# Patient Record
Sex: FEMALE | Race: BLACK OR AFRICAN AMERICAN | Employment: FULL TIME | URBAN - METROPOLITAN AREA
[De-identification: names, ages, dates, MRNs, and addresses within clinical notes are randomized per-mention and may not be internally consistent; named-entity substitution may affect disease eponyms.]

---

## 2018-06-08 ENCOUNTER — TELEPHONE (OUTPATIENT)
Dept: ORTHOPEDIC SURGERY | Age: 33
End: 2018-06-08

## 2018-06-18 ENCOUNTER — TELEPHONE (OUTPATIENT)
Dept: ORTHOPEDIC SURGERY | Age: 33
End: 2018-06-18

## 2018-06-18 DIAGNOSIS — M79.671 BILATERAL FOOT PAIN: Primary | ICD-10-CM

## 2018-06-18 DIAGNOSIS — M25.572 CHRONIC PAIN OF BOTH ANKLES: ICD-10-CM

## 2018-06-18 DIAGNOSIS — M89.8X6 TIBIAL PAIN: ICD-10-CM

## 2018-06-18 DIAGNOSIS — M79.672 BILATERAL FOOT PAIN: Primary | ICD-10-CM

## 2018-06-18 DIAGNOSIS — M25.571 CHRONIC PAIN OF BOTH ANKLES: ICD-10-CM

## 2018-06-18 DIAGNOSIS — G89.29 CHRONIC PAIN OF BOTH ANKLES: ICD-10-CM

## 2018-06-27 NOTE — TELEPHONE ENCOUNTER
Was able to reach pt on third attempt--she stated was at [facility] for bilat foot and ankle pain--had prior surgery in august of 2014 for repair of nonunion of tibia (ORIF) and tibial autografting of nonunion site-removal of hardware deep right tibia by dr checo mark--pt has follow up in this office for issues concerning op site--pt wants to be seen here for continued and worsening foot and ankle pain--appt made for 7/24/18 at 0930

## 2018-07-24 ENCOUNTER — HOSPITAL ENCOUNTER (OUTPATIENT)
Dept: GENERAL RADIOLOGY | Age: 33
Discharge: HOME | End: 2018-07-26
Payer: MEDICAID

## 2018-07-24 ENCOUNTER — OFFICE VISIT (OUTPATIENT)
Dept: ORTHOPEDIC SURGERY | Age: 33
End: 2018-07-24
Payer: MEDICAID

## 2018-07-24 VITALS
BODY MASS INDEX: 42.68 KG/M2 | TEMPERATURE: 97.5 F | WEIGHT: 250 LBS | SYSTOLIC BLOOD PRESSURE: 128 MMHG | HEIGHT: 64 IN | DIASTOLIC BLOOD PRESSURE: 84 MMHG | HEART RATE: 82 BPM

## 2018-07-24 DIAGNOSIS — G89.29 CHRONIC PAIN OF BOTH ANKLES: ICD-10-CM

## 2018-07-24 DIAGNOSIS — M19.171 POST-TRAUMATIC ARTHRITIS OF RIGHT ANKLE: ICD-10-CM

## 2018-07-24 DIAGNOSIS — M79.672 BILATERAL FOOT PAIN: ICD-10-CM

## 2018-07-24 DIAGNOSIS — M25.571 CHRONIC PAIN OF BOTH ANKLES: ICD-10-CM

## 2018-07-24 DIAGNOSIS — M79.672 HEEL PAIN, BILATERAL: Primary | ICD-10-CM

## 2018-07-24 DIAGNOSIS — M79.671 HEEL PAIN, BILATERAL: Primary | ICD-10-CM

## 2018-07-24 DIAGNOSIS — M25.572 CHRONIC PAIN OF BOTH ANKLES: ICD-10-CM

## 2018-07-24 DIAGNOSIS — M79.671 BILATERAL FOOT PAIN: ICD-10-CM

## 2018-07-24 DIAGNOSIS — M89.8X6 TIBIAL PAIN: ICD-10-CM

## 2018-07-24 PROCEDURE — 73630 X-RAY EXAM OF FOOT: CPT

## 2018-07-24 PROCEDURE — 73610 X-RAY EXAM OF ANKLE: CPT

## 2018-07-24 PROCEDURE — G8427 DOCREV CUR MEDS BY ELIG CLIN: HCPCS | Performed by: ORTHOPAEDIC SURGERY

## 2018-07-24 PROCEDURE — 99213 OFFICE O/P EST LOW 20 MIN: CPT | Performed by: PHYSICIAN ASSISTANT

## 2018-07-24 PROCEDURE — G8419 CALC BMI OUT NRM PARAM NOF/U: HCPCS | Performed by: ORTHOPAEDIC SURGERY

## 2018-07-24 PROCEDURE — 4004F PT TOBACCO SCREEN RCVD TLK: CPT | Performed by: ORTHOPAEDIC SURGERY

## 2018-07-24 PROCEDURE — 99202 OFFICE O/P NEW SF 15 MIN: CPT

## 2018-07-24 PROCEDURE — 73590 X-RAY EXAM OF LOWER LEG: CPT

## 2018-07-24 RX ORDER — VALACYCLOVIR HYDROCHLORIDE 500 MG/1
500 TABLET, FILM COATED ORAL DAILY
COMMUNITY
Start: 2018-07-11 | End: 2021-01-25 | Stop reason: ALTCHOICE

## 2018-07-24 RX ORDER — NORETHINDRONE 0.35 MG
KIT ORAL
COMMUNITY
Start: 2018-06-23 | End: 2019-04-04

## 2018-07-24 RX ORDER — ACYCLOVIR 400 MG/1
400 TABLET ORAL 2 TIMES DAILY
COMMUNITY
Start: 2018-07-11

## 2018-07-24 NOTE — PATIENT INSTRUCTIONS
Purchase supportive shoes, and gel insert heels, start physical therapy, wear ankle brace as needed for comfort, ice over heals every 20 minutes at a time every 2 hours as needed, consider ankle injection call if wish to proceed with it; Voltaren called in to pharmacy take twice daily as needed for pain.

## 2018-07-24 NOTE — PROGRESS NOTES
congestion, sinus pressure, sneezing, sore throat. Negative for headache. Eyes: Negative for visual disturbance, blurred and double vision. Negative for pain, discharge, redness and itching  Respiratory: Negative for cough, shortness of breath and wheezing. Cardiovascular: Negative for chest pain, palpitations and leg swelling. No dyspnea on exertion   Gastrointestinal:   Negative for nausea, vomiting, abdominal pain, diarrhea, constipation  or black or bloody. Hematologic\Lymphatic:  negative for bleeding, petechiae,   Genitourinary: Negative for hematuria and difficulty urinating. Musculoskeletal: Negative for neck pain and stiffness. Negative for back pain, see HPI  Skin: Negative for pallor, rash and wound. Neurological: Negative for dizziness, tremors, seizures, weakness, light-headedness, no TIA or stroke symptoms. No numbness and headaches. Psychiatric/Behavioral: Negative. OBJECTIVE:      Physical Examination:   General appearance: alert, well appearing, and in no distress,  normal appearing weight. No visible signs of trauma   Mental status: alert, oriented to person, place, and time, normal mood, behavior, speech, dress, motor activity, and thought processes  Abdomen: soft, nondistended  Resp:   resp easy and unlabored, no audible wheezes note, normal symmetrical expansion of both hemithoraces  Cardiac: distal pulses palpable, skin and extremities well perfused  Neurological: alert, oriented X3, normal speech, no focal findings or movement disorder noted, motor and sensory grossly normal bilaterally, normal muscle tone, no tremors, strength 5/5, normal gait and station  HEENT: normochephalic atraumatic, external ears and eyes normal, sclera normal, neck supple, no nasal discharge.    Extremities:   peripheral pulses normal, no edema, redness or tenderness in the calves   Skin: normal coloration, no rashes or open wounds, no suspicious skin lesions noted  Psych: Affect euthymic

## 2018-08-01 ENCOUNTER — HOSPITAL ENCOUNTER (OUTPATIENT)
Dept: PHYSICAL THERAPY | Age: 33
Setting detail: THERAPIES SERIES
Discharge: HOME | End: 2018-08-01
Payer: MEDICAID

## 2018-08-01 PROCEDURE — G8979 MOBILITY GOAL STATUS: HCPCS | Performed by: PHYSICAL THERAPIST

## 2018-08-01 PROCEDURE — 97161 PT EVAL LOW COMPLEX 20 MIN: CPT | Performed by: PHYSICAL THERAPIST

## 2018-08-01 PROCEDURE — G8978 MOBILITY CURRENT STATUS: HCPCS | Performed by: PHYSICAL THERAPIST

## 2018-08-01 ASSESSMENT — ACTIVITIES OF DAILY LIVING (ADL): EFFECT OF PAIN ON DAILY ACTIVITIES: PAIN HAMPERS PROLONGED STANDING/WALKING AS WELL AS SLEEP AT TIMES

## 2018-08-01 ASSESSMENT — PAIN SCALES - GENERAL: PAINLEVEL_OUTOF10: 6

## 2018-08-01 ASSESSMENT — PAIN DESCRIPTION - ORIENTATION: ORIENTATION: RIGHT

## 2018-08-01 ASSESSMENT — PAIN DESCRIPTION - PAIN TYPE: TYPE: CHRONIC PAIN

## 2018-08-01 ASSESSMENT — PAIN DESCRIPTION - DESCRIPTORS: DESCRIPTORS: ACHING;CONSTANT

## 2018-08-01 ASSESSMENT — PAIN DESCRIPTION - LOCATION: LOCATION: ANKLE

## 2018-08-08 ENCOUNTER — HOSPITAL ENCOUNTER (OUTPATIENT)
Dept: PHYSICAL THERAPY | Age: 33
Setting detail: THERAPIES SERIES
Discharge: HOME | End: 2018-08-08
Payer: MEDICAID

## 2018-08-08 PROCEDURE — 97530 THERAPEUTIC ACTIVITIES: CPT

## 2018-08-08 PROCEDURE — 97110 THERAPEUTIC EXERCISES: CPT

## 2018-08-16 ENCOUNTER — HOSPITAL ENCOUNTER (OUTPATIENT)
Dept: PHYSICAL THERAPY | Age: 33
Setting detail: THERAPIES SERIES
Discharge: HOME | End: 2018-08-16
Payer: MEDICAID

## 2018-08-16 PROCEDURE — 97110 THERAPEUTIC EXERCISES: CPT | Performed by: PHYSICAL THERAPIST

## 2018-08-16 PROCEDURE — 97530 THERAPEUTIC ACTIVITIES: CPT | Performed by: PHYSICAL THERAPIST

## 2018-08-16 PROCEDURE — G0283 ELEC STIM OTHER THAN WOUND: HCPCS | Performed by: PHYSICAL THERAPIST

## 2018-08-17 ENCOUNTER — HOSPITAL ENCOUNTER (OUTPATIENT)
Dept: PHYSICAL THERAPY | Age: 33
Setting detail: THERAPIES SERIES
Discharge: HOME | End: 2018-08-17
Payer: MEDICAID

## 2018-08-17 PROCEDURE — 97530 THERAPEUTIC ACTIVITIES: CPT | Performed by: PHYSICAL THERAPIST

## 2018-08-17 PROCEDURE — 97110 THERAPEUTIC EXERCISES: CPT | Performed by: PHYSICAL THERAPIST

## 2018-08-17 PROCEDURE — G0283 ELEC STIM OTHER THAN WOUND: HCPCS | Performed by: PHYSICAL THERAPIST

## 2018-08-17 NOTE — PROGRESS NOTES
[address] [facility]                Phone: [phone]   Fax: [phone]    Physical Therapy Daily Treatment Note  Date:  2018    Patient Name:  Misti Recio    :  1985  MRN: [id number]    Evaluating therapist:  DIDI Fischer            (18)  Restrictions/Precautions:    Diagnosis:  post-traumatic OA R ankle  Treatment Diagnosis:    Insurance/Certification information:  [address]  Referring Practitioner:  Luz Fletcher of care signed (Y/N):    Visit# / total visits:  4/10  Pain level: 3/10 R ankle  10/10 R knee    Time In:    0852  Time Out:  1005    Subjective:        Exercises:   Exercise/Equipment Resistance/Repetitions Other comments    StepOne   10 min              towel st 5x20s             Total Gym squats 3x10               L8             BAPS ankle PF/DF 20x each direction                 IN/EV 20x each direction                Circles (CW/ CCW)      toe raises 3x10      step ups 2x10x4\"             sides              Marching on foam  3x30s                                             Comments:      Home Exercise Program:  provided 18    Manual Treatments:      Modalities:   IFC/MH to R ankle x 15 min     Timed Code Treatment Minutes: Total Treatment Minutes:      Treatment/Activity Tolerance:  [] Patient tolerated treatment well [] Patient limited by fatique  [x] Patient limited by pain  [] Patient limited by other medical complications  [] Other:     Prognosis: [] Good [x] Fair  [] Poor    Patient Requires Follow-up: [x] Yes  [] No    Plan:   [x] Continue per plan of care [] Alter current plan (see comments)  [] Plan of care initiated [] Hold pending MD visit [] Discharge  Plan for Next Session:    Progress POC as tolerated next session. See Weekly Progress Note: []  Yes  [x]  No  Next due:        Electronically signed by:     Gloria Valdes DPT  [id number]

## 2018-08-17 NOTE — PROGRESS NOTES
S:  pt presents to therapy for two of two scheduled visits this week; at week's end she reports that her ankle/heels continue to ache with all prolonged activities; pain level given as 3/10 and seems constant in nature; no c/o buckling or LOB over last week's time; HEP going well per pt    O:  performed the exercises/treatments as written in the flowsheet for the week ending 8/17/18; initiated HEP for home management and advised pt to purchase B heel cups for her shoes; AROM R ankle WFL for all ranges; strength across R ankle grossly 4/5 for all planes     A:  alyssa tx well; pt able to perform all requested tasks with good form and slow pacing noted; AROM/strength across R ankle grossly stable since eval; gait stable with decreased stance and toe off noted R LE due to ankle pain; static/dynamic balance is GOOD; endurance for all prolonged activities is GOOD-    P:  cont with POC of stretching/strengthening for R ankle with modalities as needed

## 2018-08-22 ENCOUNTER — HOSPITAL ENCOUNTER (OUTPATIENT)
Dept: PHYSICAL THERAPY | Age: 33
Setting detail: THERAPIES SERIES
Discharge: HOME | End: 2018-08-22
Payer: MEDICAID

## 2018-08-22 PROCEDURE — G0283 ELEC STIM OTHER THAN WOUND: HCPCS | Performed by: PHYSICAL THERAPIST

## 2018-08-22 PROCEDURE — 97110 THERAPEUTIC EXERCISES: CPT | Performed by: PHYSICAL THERAPIST

## 2018-08-22 PROCEDURE — 97530 THERAPEUTIC ACTIVITIES: CPT | Performed by: PHYSICAL THERAPIST

## 2018-08-22 NOTE — PROGRESS NOTES
[address] [facility]                Phone: [phone]   Fax: [phone]    Physical Therapy Daily Treatment Note  Date:  2018    Patient Name:  Kim Juares    :  1985  MRN: [id number]    Evaluating therapist:  DIDI Pritchard            (18)  Restrictions/Precautions:    Diagnosis:  post-traumatic OA R ankle  Treatment Diagnosis:    Insurance/Certification information:  [address]  Referring Practitioner:  Brook Vizcaino of care signed (Y/N):    Visit# / total visits:  5/10  Pain level: 310 R ankle  10/10 R knee    Time In:    0932  Time Out:  1040    Subjective:        Exercises:   Exercise/Equipment Resistance/Repetitions Other comments    StepOne   10 min              towel st 5x20s             Total Gym squats 3x10               L8             BAPS ankle PF/DF 20x each direction                 IN/EV 20x each direction                Circles (CW/ CCW)      toe raises 3x10      step ups 2x10x4\"             sides              Marching on foam  3x30s                                             Comments:      Home Exercise Program:  provided 18    Manual Treatments:      Modalities:   IFC/MH to R ankle x 15 min     Timed Code Treatment Minutes: Total Treatment Minutes:      Treatment/Activity Tolerance:  [] Patient tolerated treatment well [] Patient limited by fatique  [x] Patient limited by pain  [] Patient limited by other medical complications  [] Other:     Prognosis: [] Good [x] Fair  [] Poor    Patient Requires Follow-up: [x] Yes  [] No    Plan:   [x] Continue per plan of care [] Alter current plan (see comments)  [] Plan of care initiated [] Hold pending MD visit [] Discharge  Plan for Next Session:    Progress POC as tolerated next session. See Weekly Progress Note: []  Yes  [x]  No  Next due:        Electronically signed by:     Yesica Rodriguez DPT  [id number]

## 2018-08-24 ENCOUNTER — HOSPITAL ENCOUNTER (OUTPATIENT)
Dept: PHYSICAL THERAPY | Age: 33
Setting detail: THERAPIES SERIES
Discharge: HOME | End: 2018-08-24
Payer: MEDICAID

## 2018-08-24 NOTE — PROGRESS NOTES
S:  pt presents to therapy for one of two scheduled visits this week, having cancelled on 8/24/18; at week's only visit she continued to report  her ankle/heels continue to ache with all prolonged activities; pain level given as 3/10 and seems constant in nature; no c/o buckling or LOB over last week's time; HEP going well per pt    O:  performed the exercises/treatments as written in the flowsheet for the week ending 8/23/18;   AROM R ankle [facility] for all ranges; strength across R ankle grossly 4/5 for all planes     A:  alyssa tx well; pt able to perform all requested tasks with good form and slow pacing noted; AROM/strength across R ankle grossly stable since eval; gait stable with decreased stance and toe off noted R LE due to ankle pain; static/dynamic balance is GOOD; endurance for all prolonged activities is GOOD-    P:  cont with POC of stretching/strengthening for R ankle with modalities as needed

## 2018-08-29 ENCOUNTER — HOSPITAL ENCOUNTER (OUTPATIENT)
Dept: PHYSICAL THERAPY | Age: 33
Setting detail: THERAPIES SERIES
Discharge: HOME | End: 2018-08-29
Payer: MEDICAID

## 2018-08-29 PROCEDURE — G0283 ELEC STIM OTHER THAN WOUND: HCPCS | Performed by: PHYSICAL THERAPIST

## 2018-08-29 PROCEDURE — 97110 THERAPEUTIC EXERCISES: CPT | Performed by: PHYSICAL THERAPIST

## 2018-08-29 PROCEDURE — 97530 THERAPEUTIC ACTIVITIES: CPT | Performed by: PHYSICAL THERAPIST

## 2018-08-31 ENCOUNTER — HOSPITAL ENCOUNTER (OUTPATIENT)
Dept: PHYSICAL THERAPY | Age: 33
Setting detail: THERAPIES SERIES
Discharge: HOME | End: 2018-08-31
Payer: MEDICAID

## 2018-08-31 PROCEDURE — 97110 THERAPEUTIC EXERCISES: CPT | Performed by: PHYSICAL THERAPIST

## 2018-08-31 PROCEDURE — G0283 ELEC STIM OTHER THAN WOUND: HCPCS | Performed by: PHYSICAL THERAPIST

## 2018-08-31 NOTE — PROGRESS NOTES
[address]                Phone: [phone]   Fax: [phone]    Physical Therapy Daily Treatment Note  Date:  2018    Patient Name:  Berneice Phalen    :  1985  MRN: [id number]    Evaluating therapist:  DIDI Parks            (18)  Restrictions/Precautions:    Diagnosis:  post-traumatic OA R ankle  Treatment Diagnosis:    Insurance/Certification information:  [address]  Referring Practitioner:  Rodney Dorantes of care signed (Y/N):    Visit# / total visits:  7/10  Pain level: 3/10 R ankle  10/10 R knee    Time In:    0930  Time Out:  1026    Subjective:        Exercises:   Exercise/Equipment Resistance/Repetitions Other comments    StepOne   10 min              towel st 5x20s             Total Gym squats 3x10               L8             BAPS ankle PF/DF 30x each direction                 IN/EV 30x each direction                Circles (CW/ CCW)      toe raises 3x10      step ups 2x10x6\"             sides              Marching on foam  3x30s                                             Comments:      Home Exercise Program:  provided 18    Manual Treatments:      Modalities:   IFC/MH to R ankle x 15 min     Timed Code Treatment Minutes: Total Treatment Minutes:      Treatment/Activity Tolerance:  [] Patient tolerated treatment well [] Patient limited by fatique  [x] Patient limited by pain  [] Patient limited by other medical complications  [] Other:     Prognosis: [] Good [x] Fair  [] Poor    Patient Requires Follow-up: [x] Yes  [] No    Plan:   [x] Continue per plan of care [] Alter current plan (see comments)  [] Plan of care initiated [] Hold pending MD visit [] Discharge  Plan for Next Session:    Progress POC as tolerated next session. See Weekly Progress Note: []  Yes  [x]  No  Next due:        Electronically signed by:     Ambreen Lopez

## 2018-09-26 ENCOUNTER — HOSPITAL ENCOUNTER (OUTPATIENT)
Dept: PHYSICAL THERAPY | Age: 33
Setting detail: THERAPIES SERIES
Discharge: HOME | End: 2018-09-26
Payer: MEDICAID

## 2018-09-26 NOTE — PROGRESS NOTES
[address] [facility]                Phone: [phone]   Fax: [phone]    To: Terri Potts       From: [facility]      Patient: Kim Juares       : 1985  Diagnosis:       Date: 2018  Treatment Diagnosis:  post-traumatic OA R ankle     Physical Therapy Discharge Note    Total Visits to date:   7 Cancels/No-shows to date:  1 cancellation    Plan of Care/Treatment to date:  [x] Therapeutic Exercise    [x] Modalities:  [x] Therapeutic Activity     [] Ultrasound  [x] Electrical Stimulation  [x] Gait Training      [] Cervical Traction   [] Lumbar Traction  [] Neuromuscular Re-education  [x] Cold/hotpack [] Iontophoresis  [x] Instruction in HEP      Other:  [] Manual Therapy       []    [] Aquatic Therapy       []                    ? Progress towards goals:  pt reached all stated functional LTG's for therapy and was I with HEP for home management of condition       Goal Status:  [x] Achieved [] Partially Achieved  [] Not Achieved     Patient Status: [] Continue per initial plan of Care     [x] Patient now discharged     [] Additional visits requested, Please re-certify for additional visits:          Electronically signed by: DIDI Burger     If you have any questions or concerns, please don't hesitate to call.   Thank you for your referral.

## 2018-10-12 ENCOUNTER — APPOINTMENT (OUTPATIENT)
Dept: GENERAL RADIOLOGY | Age: 33
End: 2018-10-12
Payer: MEDICAID

## 2018-10-12 ENCOUNTER — HOSPITAL ENCOUNTER (EMERGENCY)
Age: 33
Discharge: HOME | End: 2018-10-12
Attending: EMERGENCY MEDICINE
Payer: MEDICAID

## 2018-10-12 VITALS
TEMPERATURE: 100 F | WEIGHT: 250 LBS | HEIGHT: 64 IN | RESPIRATION RATE: 20 BRPM | SYSTOLIC BLOOD PRESSURE: 171 MMHG | BODY MASS INDEX: 42.68 KG/M2 | OXYGEN SATURATION: 97 % | HEART RATE: 99 BPM | DIASTOLIC BLOOD PRESSURE: 78 MMHG

## 2018-10-12 DIAGNOSIS — G43.909 MIGRAINE WITHOUT STATUS MIGRAINOSUS, NOT INTRACTABLE, UNSPECIFIED MIGRAINE TYPE: ICD-10-CM

## 2018-10-12 DIAGNOSIS — J06.9 UPPER RESPIRATORY TRACT INFECTION, UNSPECIFIED TYPE: Primary | ICD-10-CM

## 2018-10-12 DIAGNOSIS — J45.901 EXACERBATION OF ASTHMA, UNSPECIFIED ASTHMA SEVERITY, UNSPECIFIED WHETHER PERSISTENT: ICD-10-CM

## 2018-10-12 PROCEDURE — 71045 X-RAY EXAM CHEST 1 VIEW: CPT

## 2018-10-12 PROCEDURE — 6360000002 HC RX W HCPCS: Performed by: EMERGENCY MEDICINE

## 2018-10-12 PROCEDURE — 2580000003 HC RX 258: Performed by: EMERGENCY MEDICINE

## 2018-10-12 PROCEDURE — 96375 TX/PRO/DX INJ NEW DRUG ADDON: CPT

## 2018-10-12 PROCEDURE — 99285 EMERGENCY DEPT VISIT HI MDM: CPT

## 2018-10-12 PROCEDURE — 6370000000 HC RX 637 (ALT 250 FOR IP): Performed by: EMERGENCY MEDICINE

## 2018-10-12 PROCEDURE — 96374 THER/PROPH/DIAG INJ IV PUSH: CPT

## 2018-10-12 PROCEDURE — 94664 DEMO&/EVAL PT USE INHALER: CPT

## 2018-10-12 PROCEDURE — 94640 AIRWAY INHALATION TREATMENT: CPT

## 2018-10-12 RX ORDER — KETOROLAC TROMETHAMINE 30 MG/ML
15 INJECTION, SOLUTION INTRAMUSCULAR; INTRAVENOUS ONCE
Status: COMPLETED | OUTPATIENT
Start: 2018-10-12 | End: 2018-10-12

## 2018-10-12 RX ORDER — 0.9 % SODIUM CHLORIDE 0.9 %
1000 INTRAVENOUS SOLUTION INTRAVENOUS ONCE
Status: COMPLETED | OUTPATIENT
Start: 2018-10-12 | End: 2018-10-12

## 2018-10-12 RX ORDER — DIPHENHYDRAMINE HYDROCHLORIDE 50 MG/ML
25 INJECTION INTRAMUSCULAR; INTRAVENOUS ONCE
Status: COMPLETED | OUTPATIENT
Start: 2018-10-12 | End: 2018-10-12

## 2018-10-12 RX ORDER — METHYLPREDNISOLONE SODIUM SUCCINATE 125 MG/2ML
125 INJECTION, POWDER, LYOPHILIZED, FOR SOLUTION INTRAMUSCULAR; INTRAVENOUS ONCE
Status: COMPLETED | OUTPATIENT
Start: 2018-10-12 | End: 2018-10-12

## 2018-10-12 RX ORDER — PREDNISONE 10 MG/1
40 TABLET ORAL DAILY
Qty: 20 TABLET | Refills: 0 | Status: SHIPPED | OUTPATIENT
Start: 2018-10-12 | End: 2018-10-17

## 2018-10-12 RX ORDER — ALBUTEROL SULFATE 90 UG/1
2 AEROSOL, METERED RESPIRATORY (INHALATION) EVERY 4 HOURS PRN
Qty: 1 INHALER | Refills: 1 | Status: SHIPPED | OUTPATIENT
Start: 2018-10-12 | End: 2021-01-06

## 2018-10-12 RX ORDER — IPRATROPIUM BROMIDE AND ALBUTEROL SULFATE 2.5; .5 MG/3ML; MG/3ML
1 SOLUTION RESPIRATORY (INHALATION)
Status: COMPLETED | OUTPATIENT
Start: 2018-10-12 | End: 2018-10-12

## 2018-10-12 RX ADMIN — IPRATROPIUM BROMIDE AND ALBUTEROL SULFATE 1 AMPULE: .5; 3 SOLUTION RESPIRATORY (INHALATION) at 17:49

## 2018-10-12 RX ADMIN — IPRATROPIUM BROMIDE AND ALBUTEROL SULFATE 1 AMPULE: .5; 3 SOLUTION RESPIRATORY (INHALATION) at 17:53

## 2018-10-12 RX ADMIN — METHYLPREDNISOLONE SODIUM SUCCINATE 125 MG: 125 INJECTION, POWDER, FOR SOLUTION INTRAMUSCULAR; INTRAVENOUS at 17:48

## 2018-10-12 RX ADMIN — PROCHLORPERAZINE EDISYLATE 10 MG: 5 INJECTION INTRAMUSCULAR; INTRAVENOUS at 17:48

## 2018-10-12 RX ADMIN — KETOROLAC TROMETHAMINE 15 MG: 30 INJECTION INTRAMUSCULAR; INTRAVENOUS at 20:37

## 2018-10-12 RX ADMIN — IPRATROPIUM BROMIDE AND ALBUTEROL SULFATE 1 AMPULE: .5; 3 SOLUTION RESPIRATORY (INHALATION) at 17:45

## 2018-10-12 RX ADMIN — SODIUM CHLORIDE 1000 ML: 9 INJECTION, SOLUTION INTRAVENOUS at 17:48

## 2018-10-12 RX ADMIN — DIPHENHYDRAMINE HYDROCHLORIDE 25 MG: 50 INJECTION INTRAMUSCULAR; INTRAVENOUS at 17:48

## 2018-10-12 ASSESSMENT — ENCOUNTER SYMPTOMS
NAUSEA: 0
ABDOMINAL PAIN: 0
SHORTNESS OF BREATH: 0
WHEEZING: 1
COUGH: 1
BACK PAIN: 0
VOMITING: 0
BLOOD IN STOOL: 0

## 2018-10-12 ASSESSMENT — PAIN DESCRIPTION - PAIN TYPE
TYPE: ACUTE PAIN
TYPE: ACUTE PAIN

## 2018-10-12 ASSESSMENT — PAIN DESCRIPTION - LOCATION
LOCATION: GENERALIZED
LOCATION: HEAD

## 2018-10-12 ASSESSMENT — PAIN DESCRIPTION - DESCRIPTORS
DESCRIPTORS: ACHING
DESCRIPTORS: ACHING

## 2018-10-12 ASSESSMENT — PAIN SCALES - GENERAL
PAINLEVEL_OUTOF10: 6
PAINLEVEL_OUTOF10: 10
PAINLEVEL_OUTOF10: 6

## 2018-10-12 ASSESSMENT — PAIN SCALES - WONG BAKER: WONGBAKER_NUMERICALRESPONSE: 2

## 2018-10-12 NOTE — ED NOTES
Presents to the ED with cough, shortness of breath, and a headache. Started a couple of days ago. History of asthma. respiratory in to do treatments.      Dariel Mak RN  10/12/18 8669

## 2018-10-20 DIAGNOSIS — M79.672 HEEL PAIN, BILATERAL: ICD-10-CM

## 2018-10-20 DIAGNOSIS — M19.171 POST-TRAUMATIC ARTHRITIS OF RIGHT ANKLE: ICD-10-CM

## 2018-10-20 DIAGNOSIS — M79.671 HEEL PAIN, BILATERAL: ICD-10-CM

## 2018-10-25 DIAGNOSIS — M79.672 HEEL PAIN, BILATERAL: ICD-10-CM

## 2018-10-25 DIAGNOSIS — M79.671 HEEL PAIN, BILATERAL: ICD-10-CM

## 2018-10-25 DIAGNOSIS — M19.171 POST-TRAUMATIC ARTHRITIS OF RIGHT ANKLE: ICD-10-CM

## 2018-11-27 ENCOUNTER — HOSPITAL ENCOUNTER (EMERGENCY)
Age: 33
Discharge: HOME | End: 2018-11-27
Attending: EMERGENCY MEDICINE
Payer: MEDICAID

## 2018-11-27 VITALS
SYSTOLIC BLOOD PRESSURE: 117 MMHG | RESPIRATION RATE: 18 BRPM | HEART RATE: 104 BPM | OXYGEN SATURATION: 99 % | DIASTOLIC BLOOD PRESSURE: 77 MMHG

## 2018-11-27 DIAGNOSIS — R19.7 NAUSEA VOMITING AND DIARRHEA: ICD-10-CM

## 2018-11-27 DIAGNOSIS — B34.9 VIRAL SYNDROME: Primary | ICD-10-CM

## 2018-11-27 DIAGNOSIS — R11.2 NAUSEA VOMITING AND DIARRHEA: ICD-10-CM

## 2018-11-27 PROCEDURE — 6360000002 HC RX W HCPCS: Performed by: EMERGENCY MEDICINE

## 2018-11-27 PROCEDURE — 99283 EMERGENCY DEPT VISIT LOW MDM: CPT

## 2018-11-27 RX ORDER — ONDANSETRON 4 MG/1
8 TABLET, ORALLY DISINTEGRATING ORAL ONCE
Status: COMPLETED | OUTPATIENT
Start: 2018-11-27 | End: 2018-11-27

## 2018-11-27 RX ORDER — ONDANSETRON 4 MG/1
4 TABLET, ORALLY DISINTEGRATING ORAL EVERY 8 HOURS PRN
Qty: 10 TABLET | Refills: 0 | Status: ON HOLD | OUTPATIENT
Start: 2018-11-27 | End: 2019-04-05 | Stop reason: ALTCHOICE

## 2018-11-27 RX ADMIN — ONDANSETRON 8 MG: 4 TABLET, ORALLY DISINTEGRATING ORAL at 12:42

## 2018-11-27 ASSESSMENT — ENCOUNTER SYMPTOMS
DIARRHEA: 1
VOMITING: 1
WHEEZING: 1
BACK PAIN: 0
SINUS PRESSURE: 0
ABDOMINAL DISTENTION: 0
EYE PAIN: 0
COUGH: 0
EYE REDNESS: 0
EYE DISCHARGE: 0
SORE THROAT: 0
NAUSEA: 1
ABDOMINAL PAIN: 1

## 2018-11-27 ASSESSMENT — PAIN DESCRIPTION - LOCATION: LOCATION: ABDOMEN;GENERALIZED

## 2018-11-27 ASSESSMENT — PAIN DESCRIPTION - PAIN TYPE: TYPE: ACUTE PAIN

## 2018-11-27 ASSESSMENT — PAIN SCALES - GENERAL: PAINLEVEL_OUTOF10: 5

## 2018-11-27 NOTE — ED PROVIDER NOTES
supple. Cardiovascular: Normal rate and regular rhythm. Pulmonary/Chest: Effort normal and breath sounds normal. No respiratory distress. She has no wheezes. She has no rales. Abdominal: Soft. Bowel sounds are normal. There is no tenderness. There is no rebound and no guarding. Musculoskeletal: She exhibits no edema. Neurological: She is alert and oriented to person, place, and time. No cranial nerve deficit. Coordination normal.   Skin: Skin is warm and dry. Nursing note and vitals reviewed. Procedures    MDM  Number of Diagnoses or Management Options  Nausea vomiting and diarrhea:   Viral syndrome:   Diagnosis management comments: 28-year-old female presenting with viral syndrome and asthma exacerbation. Patient is a benign physical exam at this time. She is in Zofran was able to tolerate food and drink without difficulty. She was counseled on the likelihood of viral syndrome being brought about by her sick 10year-old son. A 2nd evaluation was attempted, but the patient stated that she was on the phone and too busy to speak to a physician at that time. She shut the door on the providers. Patient remained hemodynamically stable over emergency department stay and physical exam is unremarkable. She was discharged home with a short course of Zofran for any lingering nausea and vomiting. She is to follow-up with a family physician as soon as possible. ED Course as of Nov 27 1846   Tue Nov 27, 2018   1314 ATTENDING PROVIDER ATTESTATION:     I have personally performed and/or participated in the history, exam, medical decision making, and procedures and agree with all pertinent clinical information unless otherwise noted. I have also reviewed and agree with the past medical, family and social history unless otherwise noted.     I have discussed this patient in detail with the resident and provided the instruction and education regarding the evidence-based evaluation and treatment of asthma and diarrhea. History: Patient states underlying asthma history and that she has heard herself wheezing from time to time. Additionally, she states she has had loose stool. My findings: Monik Humphreys is a 35 y.o. female whom is in no distress. Physical exam reveals lungs clear and equal bilaterally, heart regular. Abdomen soft and nontender. Bowel sounds normal. During her exam, she was on her cell phone not willing to discontinue the call. My plan: Symptomatic and supportive care. 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 Past Surgical History:  has a past surgical history that includes fracture surgery (3/18/14) and Tibia fracture surgery (Right, [id number]). Social History:  reports that she has been smoking Cigars and Cigarettes. She started smoking about 2 years ago. She has a 0.20 pack-year smoking history. She has never used smokeless tobacco. She reports that she drinks alcohol. She reports that she uses drugs, including Marijuana. Family History: family history is not on file. The patients home medications have been reviewed. Allergies: Codeine and Penicillins    -------------------------------------------------- RESULTS -------------------------------------------------  Labs:  No results found for this visit on 11/27/18. Radiology:  No orders to display       ------------------------- NURSING NOTES AND VITALS REVIEWED ---------------------------  Date / Time Roomed:  11/27/2018 12:06 PM  ED Bed Assignment:  09/09    The nursing notes within the ED encounter and vital signs as below have been reviewed.    /77   Pulse 104   Resp 18 LMP 11/04/2018   SpO2 99%   Oxygen Saturation Interpretation: Normal      ------------------------------------------ PROGRESS NOTES ------------------------------------------    I have spoken with the patient and discussed todays results, in addition to providing specific details for the plan of care and counseling regarding the diagnosis and prognosis. Their questions are answered at this time and they are agreeable with the plan. I discussed at length with them reasons for immediate return here for re evaluation. They will followup with their primary care physician by calling their office tomorrow. --------------------------------- ADDITIONAL PROVIDER NOTES ---------------------------------  At this time the patient is without objective evidence of an acute process requiring hospitalization or inpatient management. They have remained hemodynamically stable throughout their entire ED visit and are stable for discharge with outpatient follow-up. The plan has been discussed in detail and they are aware of the specific conditions for emergent return, as well as the importance of follow-up. Discharge Medication List as of 11/27/2018  1:05 PM      START taking these medications    Details   ondansetron (ZOFRAN ODT) 4 MG disintegrating tablet Take 1 tablet by mouth every 8 hours as needed for Nausea or Vomiting, Disp-10 tablet, R-0Print             Diagnosis:  1. Viral syndrome    2. Nausea vomiting and diarrhea        Disposition:  Patient's disposition: Discharge to home  Patient's condition is stable.             Dejah Rene DO  Resident  11/27/18 9133

## 2019-03-25 ENCOUNTER — HOSPITAL ENCOUNTER (EMERGENCY)
Age: 34
Discharge: HOME | End: 2019-03-25
Attending: EMERGENCY MEDICINE
Payer: MEDICAID

## 2019-03-25 VITALS
SYSTOLIC BLOOD PRESSURE: 143 MMHG | TEMPERATURE: 98.4 F | DIASTOLIC BLOOD PRESSURE: 87 MMHG | WEIGHT: 260 LBS | OXYGEN SATURATION: 99 % | RESPIRATION RATE: 18 BRPM | HEIGHT: 64 IN | HEART RATE: 93 BPM | BODY MASS INDEX: 44.39 KG/M2

## 2019-03-25 DIAGNOSIS — R59.9 LYMPH NODE ENLARGEMENT: Primary | ICD-10-CM

## 2019-03-25 PROCEDURE — 6360000002 HC RX W HCPCS: Performed by: EMERGENCY MEDICINE

## 2019-03-25 PROCEDURE — 99283 EMERGENCY DEPT VISIT LOW MDM: CPT

## 2019-03-25 PROCEDURE — 96372 THER/PROPH/DIAG INJ SC/IM: CPT

## 2019-03-25 RX ORDER — DEXAMETHASONE SODIUM PHOSPHATE 10 MG/ML
10 INJECTION, SOLUTION INTRAMUSCULAR; INTRAVENOUS ONCE
Status: COMPLETED | OUTPATIENT
Start: 2019-03-25 | End: 2019-03-25

## 2019-03-25 RX ADMIN — DEXAMETHASONE SODIUM PHOSPHATE 10 MG: 10 INJECTION, SOLUTION INTRAMUSCULAR; INTRAVENOUS at 11:43

## 2019-03-25 ASSESSMENT — ENCOUNTER SYMPTOMS
CHEST TIGHTNESS: 0
SHORTNESS OF BREATH: 0
SINUS PAIN: 0

## 2019-03-25 ASSESSMENT — PAIN SCALES - GENERAL: PAINLEVEL_OUTOF10: 10

## 2019-03-25 ASSESSMENT — PAIN DESCRIPTION - FREQUENCY: FREQUENCY: CONTINUOUS

## 2019-03-25 ASSESSMENT — PAIN DESCRIPTION - ORIENTATION: ORIENTATION: RIGHT;LEFT;MID

## 2019-03-25 ASSESSMENT — PAIN DESCRIPTION - LOCATION: LOCATION: NECK;HEAD

## 2019-03-25 ASSESSMENT — PAIN DESCRIPTION - PAIN TYPE: TYPE: ACUTE PAIN

## 2019-03-29 ENCOUNTER — APPOINTMENT (OUTPATIENT)
Dept: CT IMAGING | Age: 34
End: 2019-03-29
Payer: MEDICAID

## 2019-03-29 ENCOUNTER — HOSPITAL ENCOUNTER (EMERGENCY)
Age: 34
Discharge: HOME | End: 2019-03-29
Attending: EMERGENCY MEDICINE
Payer: MEDICAID

## 2019-03-29 ENCOUNTER — APPOINTMENT (OUTPATIENT)
Dept: GENERAL RADIOLOGY | Age: 34
End: 2019-03-29
Payer: MEDICAID

## 2019-03-29 VITALS
HEIGHT: 64 IN | HEART RATE: 114 BPM | OXYGEN SATURATION: 97 % | WEIGHT: 260 LBS | TEMPERATURE: 100.9 F | RESPIRATION RATE: 16 BRPM | SYSTOLIC BLOOD PRESSURE: 112 MMHG | BODY MASS INDEX: 44.39 KG/M2 | DIASTOLIC BLOOD PRESSURE: 79 MMHG

## 2019-03-29 DIAGNOSIS — B34.9 VIRAL SYNDROME: ICD-10-CM

## 2019-03-29 DIAGNOSIS — R59.0 AXILLARY LYMPHADENOPATHY: ICD-10-CM

## 2019-03-29 DIAGNOSIS — E86.0 DEHYDRATION: ICD-10-CM

## 2019-03-29 DIAGNOSIS — J06.9 UPPER RESPIRATORY INFECTION WITH COUGH AND CONGESTION: Primary | ICD-10-CM

## 2019-03-29 LAB
ANION GAP SERPL CALCULATED.3IONS-SCNC: 10 MMOL/L (ref 7–16)
BASOPHILS ABSOLUTE: 0.01 E9/L (ref 0–0.2)
BASOPHILS RELATIVE PERCENT: 0.1 % (ref 0–2)
BILIRUBIN URINE: NEGATIVE
BLOOD, URINE: NEGATIVE
BUN BLDV-MCNC: 9 MG/DL (ref 6–20)
CALCIUM SERPL-MCNC: 7.9 MG/DL (ref 8.6–10.2)
CHLORIDE BLD-SCNC: 98 MMOL/L (ref 98–107)
CLARITY: CLEAR
CO2: 24 MMOL/L (ref 22–29)
COLOR: YELLOW
CREAT SERPL-MCNC: 0.9 MG/DL (ref 0.5–1)
EOSINOPHILS ABSOLUTE: 0.51 E9/L (ref 0.05–0.5)
EOSINOPHILS RELATIVE PERCENT: 7.5 % (ref 0–6)
GFR AFRICAN AMERICAN: >60
GFR NON-AFRICAN AMERICAN: >60 ML/MIN/1.73
GLUCOSE BLD-MCNC: 143 MG/DL (ref 74–99)
GLUCOSE URINE: NEGATIVE MG/DL
HCG, URINE, POC: NEGATIVE
HCT VFR BLD CALC: 41 % (ref 34–48)
HEMOGLOBIN: 13.1 G/DL (ref 11.5–15.5)
IMMATURE GRANULOCYTES #: 0.07 E9/L
IMMATURE GRANULOCYTES %: 1 % (ref 0–5)
INFLUENZA A BY PCR: NOT DETECTED
INFLUENZA B BY PCR: NOT DETECTED
KETONES, URINE: NEGATIVE MG/DL
LACTIC ACID: 1.7 MMOL/L (ref 0.5–2.2)
LEUKOCYTE ESTERASE, URINE: NEGATIVE
LYMPHOCYTES ABSOLUTE: 0.3 E9/L (ref 1.5–4)
LYMPHOCYTES RELATIVE PERCENT: 4.4 % (ref 20–42)
Lab: NORMAL
MCH RBC QN AUTO: 25.8 PG (ref 26–35)
MCHC RBC AUTO-ENTMCNC: 32 % (ref 32–34.5)
MCV RBC AUTO: 80.7 FL (ref 80–99.9)
MONO TEST: NEGATIVE
MONOCYTES ABSOLUTE: 0.35 E9/L (ref 0.1–0.95)
MONOCYTES RELATIVE PERCENT: 5.1 % (ref 2–12)
NEGATIVE QC PASS/FAIL: NORMAL
NEUTROPHILS ABSOLUTE: 5.56 E9/L (ref 1.8–7.3)
NEUTROPHILS RELATIVE PERCENT: 81.9 % (ref 43–80)
NITRITE, URINE: NEGATIVE
PDW BLD-RTO: 14.3 FL (ref 11.5–15)
PH UA: 7.5 (ref 5–9)
PLATELET # BLD: 244 E9/L (ref 130–450)
PMV BLD AUTO: 10.3 FL (ref 7–12)
POSITIVE QC PASS/FAIL: NORMAL
POTASSIUM REFLEX MAGNESIUM: 3.6 MMOL/L (ref 3.5–5)
PROTEIN UA: NEGATIVE MG/DL
RBC # BLD: 5.08 E12/L (ref 3.5–5.5)
RBC # BLD: NORMAL 10*6/UL
SODIUM BLD-SCNC: 132 MMOL/L (ref 132–146)
SPECIFIC GRAVITY UA: 1.01 (ref 1–1.03)
STREP GRP A PCR: NEGATIVE
UROBILINOGEN, URINE: 1 E.U./DL
WBC # BLD: 6.8 E9/L (ref 4.5–11.5)

## 2019-03-29 PROCEDURE — 96374 THER/PROPH/DIAG INJ IV PUSH: CPT

## 2019-03-29 PROCEDURE — 85025 COMPLETE CBC W/AUTO DIFF WBC: CPT

## 2019-03-29 PROCEDURE — 74176 CT ABD & PELVIS W/O CONTRAST: CPT

## 2019-03-29 PROCEDURE — 6360000002 HC RX W HCPCS: Performed by: NURSE PRACTITIONER

## 2019-03-29 PROCEDURE — 96375 TX/PRO/DX INJ NEW DRUG ADDON: CPT

## 2019-03-29 PROCEDURE — 2580000003 HC RX 258: Performed by: NURSE PRACTITIONER

## 2019-03-29 PROCEDURE — 81003 URINALYSIS AUTO W/O SCOPE: CPT

## 2019-03-29 PROCEDURE — 71275 CT ANGIOGRAPHY CHEST: CPT

## 2019-03-29 PROCEDURE — 80048 BASIC METABOLIC PNL TOTAL CA: CPT

## 2019-03-29 PROCEDURE — 2580000003 HC RX 258: Performed by: RADIOLOGY

## 2019-03-29 PROCEDURE — 86308 HETEROPHILE ANTIBODY SCREEN: CPT

## 2019-03-29 PROCEDURE — 6360000004 HC RX CONTRAST MEDICATION: Performed by: RADIOLOGY

## 2019-03-29 PROCEDURE — 71046 X-RAY EXAM CHEST 2 VIEWS: CPT

## 2019-03-29 PROCEDURE — 96361 HYDRATE IV INFUSION ADD-ON: CPT

## 2019-03-29 PROCEDURE — 87880 STREP A ASSAY W/OPTIC: CPT

## 2019-03-29 PROCEDURE — 87502 INFLUENZA DNA AMP PROBE: CPT

## 2019-03-29 PROCEDURE — 83605 ASSAY OF LACTIC ACID: CPT

## 2019-03-29 PROCEDURE — 99284 EMERGENCY DEPT VISIT MOD MDM: CPT

## 2019-03-29 PROCEDURE — 6370000000 HC RX 637 (ALT 250 FOR IP): Performed by: NURSE PRACTITIONER

## 2019-03-29 RX ORDER — ONDANSETRON 2 MG/ML
4 INJECTION INTRAMUSCULAR; INTRAVENOUS ONCE
Status: COMPLETED | OUTPATIENT
Start: 2019-03-29 | End: 2019-03-29

## 2019-03-29 RX ORDER — KETOROLAC TROMETHAMINE 30 MG/ML
30 INJECTION, SOLUTION INTRAMUSCULAR; INTRAVENOUS ONCE
Status: COMPLETED | OUTPATIENT
Start: 2019-03-29 | End: 2019-03-29

## 2019-03-29 RX ORDER — ACETAMINOPHEN 500 MG
1000 TABLET ORAL ONCE
Status: COMPLETED | OUTPATIENT
Start: 2019-03-29 | End: 2019-03-29

## 2019-03-29 RX ORDER — AZITHROMYCIN 250 MG/1
500 TABLET, FILM COATED ORAL ONCE
Status: COMPLETED | OUTPATIENT
Start: 2019-03-29 | End: 2019-03-29

## 2019-03-29 RX ORDER — ACETAMINOPHEN 325 MG/1
650 TABLET ORAL ONCE
Status: COMPLETED | OUTPATIENT
Start: 2019-03-29 | End: 2019-03-29

## 2019-03-29 RX ORDER — CETIRIZINE HYDROCHLORIDE 10 MG/1
10 TABLET ORAL DAILY
Qty: 30 TABLET | Refills: 0 | Status: SHIPPED | OUTPATIENT
Start: 2019-03-29 | End: 2019-04-28

## 2019-03-29 RX ORDER — BENZONATATE 100 MG/1
200 CAPSULE ORAL 3 TIMES DAILY PRN
Qty: 21 CAPSULE | Refills: 0 | Status: ON HOLD | OUTPATIENT
Start: 2019-03-29 | End: 2019-04-09

## 2019-03-29 RX ORDER — BENZONATATE 100 MG/1
200 CAPSULE ORAL ONCE
Status: COMPLETED | OUTPATIENT
Start: 2019-03-29 | End: 2019-03-29

## 2019-03-29 RX ORDER — SODIUM CHLORIDE 0.9 % (FLUSH) 0.9 %
10 SYRINGE (ML) INJECTION ONCE
Status: COMPLETED | OUTPATIENT
Start: 2019-03-29 | End: 2019-03-29

## 2019-03-29 RX ORDER — IBUPROFEN 800 MG/1
800 TABLET ORAL EVERY 6 HOURS PRN
Qty: 20 TABLET | Refills: 3 | Status: SHIPPED | OUTPATIENT
Start: 2019-03-29 | End: 2021-01-06

## 2019-03-29 RX ORDER — CETIRIZINE HYDROCHLORIDE 10 MG/1
10 TABLET ORAL ONCE
Status: COMPLETED | OUTPATIENT
Start: 2019-03-29 | End: 2019-03-29

## 2019-03-29 RX ORDER — 0.9 % SODIUM CHLORIDE 0.9 %
1000 INTRAVENOUS SOLUTION INTRAVENOUS ONCE
Status: COMPLETED | OUTPATIENT
Start: 2019-03-29 | End: 2019-03-29

## 2019-03-29 RX ORDER — AZITHROMYCIN 250 MG/1
TABLET, FILM COATED ORAL
Qty: 1 PACKET | Refills: 0 | Status: SHIPPED | OUTPATIENT
Start: 2019-03-29 | End: 2019-04-02

## 2019-03-29 RX ADMIN — AZITHROMYCIN 500 MG: 250 TABLET, FILM COATED ORAL at 20:39

## 2019-03-29 RX ADMIN — ONDANSETRON 4 MG: 2 INJECTION INTRAMUSCULAR; INTRAVENOUS at 21:14

## 2019-03-29 RX ADMIN — BENZONATATE 200 MG: 100 CAPSULE ORAL at 19:56

## 2019-03-29 RX ADMIN — ACETAMINOPHEN 650 MG: 325 TABLET ORAL at 19:56

## 2019-03-29 RX ADMIN — KETOROLAC TROMETHAMINE 30 MG: 30 INJECTION, SOLUTION INTRAMUSCULAR; INTRAVENOUS at 21:15

## 2019-03-29 RX ADMIN — ACETAMINOPHEN 1000 MG: 500 TABLET ORAL at 22:57

## 2019-03-29 RX ADMIN — IOPAMIDOL 80 ML: 755 INJECTION, SOLUTION INTRAVENOUS at 22:19

## 2019-03-29 RX ADMIN — SODIUM CHLORIDE 1000 ML: 9 INJECTION, SOLUTION INTRAVENOUS at 21:14

## 2019-03-29 RX ADMIN — CETIRIZINE HYDROCHLORIDE 10 MG: 10 TABLET, FILM COATED ORAL at 19:56

## 2019-03-29 RX ADMIN — SODIUM CHLORIDE 1000 ML: 9 INJECTION, SOLUTION INTRAVENOUS at 21:53

## 2019-03-29 RX ADMIN — Medication 10 ML: at 22:19

## 2019-03-29 ASSESSMENT — PAIN SCALES - GENERAL
PAINLEVEL_OUTOF10: 10
PAINLEVEL_OUTOF10: 9
PAINLEVEL_OUTOF10: 10

## 2019-03-29 ASSESSMENT — PAIN DESCRIPTION - LOCATION: LOCATION: HEAD;NECK

## 2019-03-29 ASSESSMENT — PAIN DESCRIPTION - PAIN TYPE: TYPE: ACUTE PAIN

## 2019-03-29 NOTE — ED PROVIDER NOTES
Seen with Attending Physician         HPI:  3/29/19,   Time: 6:46 PM         Donna Pascual is a 35 y.o. female presenting to the ED for progressively worsening since 3/25/2019 cough, body aches, neck swollen lymph nodes, and feeling ill. Was seen here 3/25/19 and received steroid shot. No flu shot. The complaint has been persistent, moderate in severity, and worsened by nothing. Denies N/V/SOB/HA/CP/Abd Pain/visual changes or eye pain/fall, injury, or other trauma/LOC or Syncope/Lightheadedness/change in sensation, numbness, tingling, function of neck, facial structures, bilateral upper and lower extremities /change in function of or incontinence of bowel or bladder /hematuria or dysuria. ROS:   Pertinent positives and negatives are stated within HPI, all other systems reviewed and are negative.  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 Past Surgical History:  has a past surgical history that includes fracture surgery (3/18/14) and Tibia fracture surgery (Right, [id number]). Social History:  reports that she has quit smoking. Her smoking use included cigars and cigarettes. She started smoking about 3 years ago. She has a 0.20 pack-year smoking history. She has never used smokeless tobacco. She reports that she drinks alcohol. She reports that she has current or past drug history. Drug: Marijuana. Family History: family history is not on file. The patients home medications have been reviewed.     Allergies: Codeine and Penicillins    -------------------------------------------------- RESULTS -------------------------------------------------  All laboratory and radiology results have been personally reviewed by myself   LABS:  Results for orders placed or performed during the hospital encounter of 03/29/19   Rapid influenza A/B antigens   Result Value Ref Range    Influenza A by PCR Not Detected Not Detected    Influenza B by PCR Not Detected Not Detected   Strep Screen Group A Throat   Result Value Ref Range    Strep Grp A PCR Negative Negative   CBC Auto Differential   Result Value Ref Range    WBC 6.8 4.5 - 11.5 E9/L    RBC 5.08 3.50 - 5.50 E12/L    Hemoglobin 13.1 11.5 - 15.5 g/dL    Hematocrit 41.0 34.0 - 48.0 %    MCV 80.7 80.0 - 99.9 fL    MCH 25.8 (L) 26.0 - 35.0 pg    MCHC 32.0 32.0 - 34.5 %    RDW 14.3 11.5 - 15.0 fL    Platelets 783 935 - 027 E9/L    MPV 10.3 7.0 - 12.0 fL    Neutrophils % 81.9 (H) 43.0 - 80.0 %    Immature Granulocytes % 1.0 0.0 - 5.0 %    Lymphocytes % 4.4 (L) 20.0 - 42.0 %    Monocytes % 5.1 2.0 - 12.0 %    Eosinophils % 7.5 (H) 0.0 - 6.0 %    Basophils % 0.1 0.0 - 2.0 %    Neutrophils # 5.56 1.80 - 7.30 E9/L    Immature Granulocytes # 0.07 E9/L    Lymphocytes # 0.30 (L) 1.50 - 4.00 E9/L    Monocytes # 0.35 0.10 - 0.95 E9/L    Eosinophils # 0.51 (H) 0.05 - 0.50 E9/L    Basophils # 0.01 0.00 - 0.20 E9/L    RBC Morphology Normal    Basic Metabolic Panel w/ Reflex to MG   Result Value Ref Range    Sodium 132 132 - 146 mmol/L    Potassium reflex Magnesium 3.6 3.5 - 5.0 mmol/L    Chloride 98 98 - 107 mmol/L    CO2 24 22 - 29 mmol/L    Anion Gap 10 7 - 16 mmol/L    Glucose 143 (H) 74 - 99 mg/dL    BUN 9 6 - 20 mg/dL    CREATININE 0.9 0.5 - 1.0 mg/dL    GFR Non-African American >60 >=60 mL/min/1.73    GFR African American >60     Calcium 7.9 (L) 8.6 - 10.2 mg/dL   Lactic Acid, Plasma   Result Value Ref Range    Lactic Acid 1.7 0.5 - 2.2 mmol/L   Urinalysis, reflex to microscopic   Result Value Ref Range    Color, UA Yellow Straw/Yellow    Clarity, UA Clear Clear    Glucose, Ur Negative Negative mg/dL    Bilirubin Urine Negative Negative    Ketones, Urine Negative Negative mg/dL    Specific Gravity, UA 1.010 1.005 - 1.030    Blood, Urine Negative Negative    pH, UA 7.5 5.0 - 9.0 Protein, UA Negative Negative mg/dL    Urobilinogen, Urine 1.0 <2.0 E.U./dL    Nitrite, Urine Negative Negative    Leukocyte Esterase, Urine Negative Negative   Mononucleosis screen   Result Value Ref Range    Mono Test Negative Negative   POC Pregnancy Urine   Result Value Ref Range    HCG, Urine, POC Negative Negative    Lot Number [id number]     Positive QC Pass/Fail Pass     Negative QC Pass/Fail Pass        RADIOLOGY:  Interpreted by Radiologist.  CTA PULMONARY W CONTRAST   Final Result      Bilateral axillary lymphadenopathy. No evidence for pulmonary embolism or aortic dissection. CT ABDOMEN PELVIS WO CONTRAST Additional Contrast? None   Final Result      No evidence for acute process on CT of abdomen and pelvis. XR CHEST STANDARD (2 VW)   Final Result   No acute cardiopulmonary findings. ------------------------- NURSING NOTES AND VITALS REVIEWED ---------------------------   The nursing notes within the ED encounter and vital signs as below have been reviewed. /79   Pulse 114   Temp 100.9 °F (38.3 °C) (Oral)   Resp 16   Ht 5' 4\" (1.626 m)   Wt 260 lb (117.9 kg)   LMP 03/12/2019   SpO2 97%   BMI 44.63 kg/m²   Oxygen Saturation Interpretation: Normal    Vitals:    03/29/19 1842 03/29/19 2103 03/29/19 2248   BP: 119/65 102/75 112/79   Pulse: 122 112 114   Resp: 14  16   Temp: 100.9 °F (38.3 °C)     TempSrc: Oral     SpO2: 98%  97%   Weight: 260 lb (117.9 kg)     Height: 5' 4\" (1.626 m)         ---------------------------------------------------PHYSICAL EXAM--------------------------------------      Constitutional/General: Alert and oriented x3, well appearing, non toxic in NAD  Head: NC/AT  Eye/Ears: PERRL, bilaterally EOMs without pain, no globe tenderness, sclera clear.  Bilateral tympanic membranes are normal, no pre-or postauricular nodes, mastoids are normal.  Nose: Bilateral nasal turbinates mildly injected, boggy, clear mucus noted  Mouth: Oropharynx is moderately injected with copious clear mucus no lesions or exudate no swelling, tonsils are +2 symmetrical no lesions or exudate, uvula ML normal, handling secretions, MMM, no trismus or TMJ, normal dentition w/o abscess noted  Neck: Supple, full ROM, no ML cervical vertebral bone tenderness throughout, no meningeal signs  Pulmonary: Lungs clear to auscultation bilaterally, no wheezes, rales, or rhonchi. Not in respiratory distress  Cardiovascular:  Regular rate and rhythm, no murmurs, gallops, or rubs. 2+ distal pulses  Abdomen: Obese habitus limits full exam, Soft, non tender, non distended, no organomegaly, no masses, normal active bowel sounds throughout, negative CVAT, no peritoneal signs. Back:NT  Extremities: Moves all extremities x 4. Warm and well perfused  Skin: warm and dry without rash  Neurologic: GCS 15, Face is symmetric (VII), EOMs are intact (III, IV, VI), pupils are equal and reactive (II, III), tongue is midline (XII). The patient is walking in a smooth balanced and coordinated fashion w/o bumping or walking into anything (vision), talking w/ appropriate content and fluency, is fully attentive, and provided a spontaneous coherent history. Psych: Normal Affect      ------------------------------------------PROGRESS NOTES -------------------------------------------    MDM  Patient has failed outpatient treatment has a shift in her white count. Pending Monospot treating patient with Zithromax and symptomatically and have the patient follow up with her PCP. CTA chest was negative for PE, abdomen CT was unremarkable for acute processes as well. Danger signs symptoms and need for emergency follow-up detail with the patient. Handouts are provided and she stated her verbal understanding. We also discussed the need follow-up with her primary care regarding the lymphadenopathy in the axillary areas and she stated she would do that.     COURSE MEDICATIONS:                Medications   0.9 % sodium chloride bolus (1,000 mLs Intravenous New Bag 3/29/19 2153)   acetaminophen (TYLENOL) tablet 1,000 mg (has no administration in time range)   acetaminophen (TYLENOL) tablet 650 mg (650 mg Oral Given 3/29/19 1956)   benzonatate (TESSALON) capsule 200 mg (200 mg Oral Given 3/29/19 1956)   cetirizine (ZYRTEC) tablet 10 mg (10 mg Oral Given 3/29/19 1956)   azithromycin (ZITHROMAX) tablet 500 mg (500 mg Oral Given 3/29/19 2039)   0.9 % sodium chloride bolus (0 mLs Intravenous Stopped 3/29/19 2214)   ketorolac (TORADOL) injection 30 mg (30 mg Intravenous Given 3/29/19 2115)   ondansetron (ZOFRAN) injection 4 mg (4 mg Intravenous Given 3/29/19 2114)   iopamidol (ISOVUE-370) 76 % injection 80 mL (80 mLs Intravenous Given 3/29/19 2219)   sodium chloride flush 0.9 % injection 10 mL (10 mLs Intravenous Given 3/29/19 2219)       RE-Evaluation(s):    Time: 2025   Patients symptoms are unchanged although she is eating and drinking  Repeat physical examination has unchanged. Added on the monospot a, IV fluids, repeat vitals and amb pulse ox. Discussed case with Dr. Jass Hoyt and asked hi to see this pt. Re-examination:  3/29/19     Time: 2150  Patients symptoms show no change. Repeat physical examination has slightly improved. Will give the patient 1 more liter of IV fluids obtain a CT of her chest CT of her abdomen and she was having left lower quadrant pain complained to Dr. Jass Hoyt about this. Re-examination:  3/29/19     Time: 2250  Patients symptoms are improving. Repeat physical examination has improved and is sitting up in the bed [facility] style and testing leaning forward with the phone on the bed in front of her. She is appearing to be in no acute distress.      COUNSELING:   I have spoken with the patient and family and discussed todays results, in addition to providing specific details for the plan of care and counseling regarding the diagnosis and prognosis.     --------------------------------------- IMPRESSION & DISPOSITION --------------------------------     IMPRESSION(s):  1. Upper respiratory infection with cough and congestion    2. Axillary lymphadenopathy    3. Dehydration    4. Viral syndrome          DISPOSITION:  Disposition: Discharge to home. Patient condition is good.                  Brennan Mcallister, ARNOLD - CNP  03/29/19 9989

## 2019-03-30 NOTE — ED NOTES
KIKI Waller notified that patient still having pain, no orders given to this RN.      Radha Luke RN  94/70/88 [id number]

## 2019-03-30 NOTE — ED NOTES
This nurse ambulated pt with pulse ox. SPO2 92-95% on room air, -130's. K. Elissa Torrez NP notified.       Mali [place], [place]  39/21/39 [id number]

## 2019-04-04 ENCOUNTER — HOSPITAL ENCOUNTER (OUTPATIENT)
Age: 34
Discharge: HOME | End: 2019-04-06
Payer: MEDICAID

## 2019-04-04 ENCOUNTER — OFFICE VISIT (OUTPATIENT)
Dept: FAMILY MEDICINE CLINIC | Age: 34
End: 2019-04-04
Payer: MEDICAID

## 2019-04-04 ENCOUNTER — HOSPITAL ENCOUNTER (OUTPATIENT)
Age: 34
Setting detail: OBSERVATION
Discharge: HOME | End: 2019-04-09
Attending: EMERGENCY MEDICINE | Admitting: INTERNAL MEDICINE
Payer: MEDICAID

## 2019-04-04 ENCOUNTER — APPOINTMENT (OUTPATIENT)
Dept: ULTRASOUND IMAGING | Age: 34
End: 2019-04-04
Payer: MEDICAID

## 2019-04-04 VITALS
RESPIRATION RATE: 16 BRPM | OXYGEN SATURATION: 98 % | DIASTOLIC BLOOD PRESSURE: 76 MMHG | TEMPERATURE: 98.7 F | HEART RATE: 95 BPM | WEIGHT: 256 LBS | BODY MASS INDEX: 43.94 KG/M2 | SYSTOLIC BLOOD PRESSURE: 110 MMHG

## 2019-04-04 DIAGNOSIS — R59.1 LYMPHADENOPATHY OF HEAD AND NECK: ICD-10-CM

## 2019-04-04 DIAGNOSIS — T78.40XA ALLERGIC REACTION, INITIAL ENCOUNTER: ICD-10-CM

## 2019-04-04 DIAGNOSIS — R59.1 LYMPHADENOPATHY: ICD-10-CM

## 2019-04-04 DIAGNOSIS — K11.1 PAROTID HYPERTROPHY: Primary | ICD-10-CM

## 2019-04-04 DIAGNOSIS — E86.0 DEHYDRATION: Primary | ICD-10-CM

## 2019-04-04 DIAGNOSIS — R11.2 NON-INTRACTABLE VOMITING WITH NAUSEA, UNSPECIFIED VOMITING TYPE: ICD-10-CM

## 2019-04-04 DIAGNOSIS — K11.1 PAROTID HYPERTROPHY: ICD-10-CM

## 2019-04-04 DIAGNOSIS — R50.81 FEVER IN OTHER DISEASES: ICD-10-CM

## 2019-04-04 LAB
ALBUMIN SERPL-MCNC: 3.3 G/DL (ref 3.5–5.2)
ALP BLD-CCNC: 132 U/L (ref 35–104)
ALT SERPL-CCNC: 100 U/L (ref 0–32)
ANION GAP SERPL CALCULATED.3IONS-SCNC: 8 MMOL/L (ref 7–16)
AST SERPL-CCNC: 71 U/L (ref 0–31)
BASOPHILS ABSOLUTE: 0 E9/L (ref 0–0.2)
BASOPHILS ABSOLUTE: 0.03 E9/L (ref 0–0.2)
BASOPHILS RELATIVE PERCENT: 0.2 % (ref 0–2)
BASOPHILS RELATIVE PERCENT: 0.8 % (ref 0–2)
BILIRUB SERPL-MCNC: 0.4 MG/DL (ref 0–1.2)
BUN BLDV-MCNC: 7 MG/DL (ref 6–20)
CALCIUM SERPL-MCNC: 8.2 MG/DL (ref 8.6–10.2)
CHLORIDE BLD-SCNC: 104 MMOL/L (ref 98–107)
CO2: 23 MMOL/L (ref 22–29)
CREAT SERPL-MCNC: 1.1 MG/DL (ref 0.5–1)
EOSINOPHILS ABSOLUTE: 1.04 E9/L (ref 0.05–0.5)
EOSINOPHILS ABSOLUTE: 5.74 E9/L (ref 0.05–0.5)
EOSINOPHILS RELATIVE PERCENT: 35.2 % (ref 0–6)
EOSINOPHILS RELATIVE PERCENT: 8.5 % (ref 0–6)
GFR AFRICAN AMERICAN: >60
GFR NON-AFRICAN AMERICAN: >60 ML/MIN/1.73
GLUCOSE BLD-MCNC: 116 MG/DL (ref 74–99)
HCT VFR BLD CALC: 37.7 % (ref 34–48)
HCT VFR BLD CALC: 40 % (ref 34–48)
HEMOGLOBIN: 12.7 G/DL (ref 11.5–15.5)
HEMOGLOBIN: 12.8 G/DL (ref 11.5–15.5)
IMMATURE GRANULOCYTES #: 0.32 E9/L
IMMATURE GRANULOCYTES %: 2.6 % (ref 0–5)
INFLUENZA A BY PCR: NOT DETECTED
INFLUENZA B BY PCR: NOT DETECTED
LACTIC ACID: 2 MMOL/L (ref 0.5–2.2)
LIPASE: 7 U/L (ref 13–60)
LYMPHOCYTES ABSOLUTE: 0.55 E9/L (ref 1.5–4)
LYMPHOCYTES ABSOLUTE: 5.71 E9/L (ref 1.5–4)
LYMPHOCYTES RELATIVE PERCENT: 35.2 % (ref 20–42)
LYMPHOCYTES RELATIVE PERCENT: 4.5 % (ref 20–42)
MAGNESIUM: 1.6 MG/DL (ref 1.6–2.6)
MCH RBC QN AUTO: 25.8 PG (ref 26–35)
MCH RBC QN AUTO: 28.5 PG (ref 26–35)
MCHC RBC AUTO-ENTMCNC: 31.8 % (ref 32–34.5)
MCHC RBC AUTO-ENTMCNC: 34 % (ref 32–34.5)
MCV RBC AUTO: 81.3 FL (ref 80–99.9)
MCV RBC AUTO: 84 FL (ref 80–99.9)
MONOCYTES ABSOLUTE: 0.44 E9/L (ref 0.1–0.95)
MONOCYTES ABSOLUTE: 0.65 E9/L (ref 0.1–0.95)
MONOCYTES RELATIVE PERCENT: 3.6 % (ref 2–12)
MONOCYTES RELATIVE PERCENT: 3.7 % (ref 2–12)
NEUTROPHILS ABSOLUTE: 4.24 E9/L (ref 1.8–7.3)
NEUTROPHILS ABSOLUTE: 9.79 E9/L (ref 1.8–7.3)
NEUTROPHILS RELATIVE PERCENT: 25.9 % (ref 43–80)
NEUTROPHILS RELATIVE PERCENT: 80.6 % (ref 43–80)
PDW BLD-RTO: 15.2 FL (ref 11.5–15)
PDW BLD-RTO: 17.4 FL (ref 11.5–15)
PLATELET # BLD: 206 E9/L (ref 130–450)
PLATELET # BLD: 226 E9/L (ref 130–450)
PMV BLD AUTO: 11.3 FL (ref 7–12)
PMV BLD AUTO: 11.8 FL (ref 7–12)
POTASSIUM REFLEX MAGNESIUM: 3.4 MMOL/L (ref 3.5–5)
RBC # BLD: 4.49 E12/L (ref 3.5–5.5)
RBC # BLD: 4.92 E12/L (ref 3.5–5.5)
RBC # BLD: NORMAL 10*6/UL
SODIUM BLD-SCNC: 135 MMOL/L (ref 132–146)
TOTAL PROTEIN: 6.7 G/DL (ref 6.4–8.3)
WBC # BLD: 12.2 E9/L (ref 4.5–11.5)
WBC # BLD: 16.3 E9/L (ref 4.5–11.5)

## 2019-04-04 PROCEDURE — 85025 COMPLETE CBC W/AUTO DIFF WBC: CPT

## 2019-04-04 PROCEDURE — 87040 BLOOD CULTURE FOR BACTERIA: CPT

## 2019-04-04 PROCEDURE — 6360000002 HC RX W HCPCS: Performed by: EMERGENCY MEDICINE

## 2019-04-04 PROCEDURE — 87798 DETECT AGENT NOS DNA AMP: CPT

## 2019-04-04 PROCEDURE — 87633 RESP VIRUS 12-25 TARGETS: CPT

## 2019-04-04 PROCEDURE — 80053 COMPREHEN METABOLIC PANEL: CPT

## 2019-04-04 PROCEDURE — 96374 THER/PROPH/DIAG INJ IV PUSH: CPT

## 2019-04-04 PROCEDURE — 2580000003 HC RX 258: Performed by: EMERGENCY MEDICINE

## 2019-04-04 PROCEDURE — 6370000000 HC RX 637 (ALT 250 FOR IP): Performed by: EMERGENCY MEDICINE

## 2019-04-04 PROCEDURE — 96375 TX/PRO/DX INJ NEW DRUG ADDON: CPT

## 2019-04-04 PROCEDURE — 76705 ECHO EXAM OF ABDOMEN: CPT

## 2019-04-04 PROCEDURE — 83690 ASSAY OF LIPASE: CPT

## 2019-04-04 PROCEDURE — 87486 CHLMYD PNEUM DNA AMP PROBE: CPT

## 2019-04-04 PROCEDURE — 86735 MUMPS ANTIBODY: CPT

## 2019-04-04 PROCEDURE — 87502 INFLUENZA DNA AMP PROBE: CPT

## 2019-04-04 PROCEDURE — G8417 CALC BMI ABV UP PARAM F/U: HCPCS | Performed by: FAMILY MEDICINE

## 2019-04-04 PROCEDURE — 99203 OFFICE O/P NEW LOW 30 MIN: CPT | Performed by: FAMILY MEDICINE

## 2019-04-04 PROCEDURE — 83735 ASSAY OF MAGNESIUM: CPT

## 2019-04-04 PROCEDURE — 93005 ELECTROCARDIOGRAM TRACING: CPT

## 2019-04-04 PROCEDURE — 87581 M.PNEUMON DNA AMP PROBE: CPT

## 2019-04-04 PROCEDURE — 83605 ASSAY OF LACTIC ACID: CPT

## 2019-04-04 PROCEDURE — 96372 THER/PROPH/DIAG INJ SC/IM: CPT | Performed by: FAMILY MEDICINE

## 2019-04-04 PROCEDURE — 96376 TX/PRO/DX INJ SAME DRUG ADON: CPT

## 2019-04-04 PROCEDURE — 99285 EMERGENCY DEPT VISIT HI MDM: CPT

## 2019-04-04 PROCEDURE — G8427 DOCREV CUR MEDS BY ELIG CLIN: HCPCS | Performed by: FAMILY MEDICINE

## 2019-04-04 PROCEDURE — 1036F TOBACCO NON-USER: CPT | Performed by: FAMILY MEDICINE

## 2019-04-04 PROCEDURE — 96361 HYDRATE IV INFUSION ADD-ON: CPT

## 2019-04-04 RX ORDER — METHYLPREDNISOLONE SODIUM SUCCINATE 125 MG/2ML
125 INJECTION, POWDER, LYOPHILIZED, FOR SOLUTION INTRAMUSCULAR; INTRAVENOUS ONCE
Status: COMPLETED | OUTPATIENT
Start: 2019-04-04 | End: 2019-04-04

## 2019-04-04 RX ORDER — KETOROLAC TROMETHAMINE 30 MG/ML
15 INJECTION, SOLUTION INTRAMUSCULAR; INTRAVENOUS ONCE
Status: COMPLETED | OUTPATIENT
Start: 2019-04-04 | End: 2019-04-04

## 2019-04-04 RX ORDER — ACETAMINOPHEN 500 MG
1000 TABLET ORAL ONCE
Status: COMPLETED | OUTPATIENT
Start: 2019-04-04 | End: 2019-04-04

## 2019-04-04 RX ORDER — DIPHENHYDRAMINE HYDROCHLORIDE 50 MG/ML
50 INJECTION INTRAMUSCULAR; INTRAVENOUS ONCE
Status: COMPLETED | OUTPATIENT
Start: 2019-04-04 | End: 2019-04-04

## 2019-04-04 RX ORDER — POTASSIUM CHLORIDE 20 MEQ/1
40 TABLET, EXTENDED RELEASE ORAL ONCE
Status: COMPLETED | OUTPATIENT
Start: 2019-04-04 | End: 2019-04-04

## 2019-04-04 RX ORDER — METHYLPREDNISOLONE ACETATE 80 MG/ML
80 INJECTION, SUSPENSION INTRA-ARTICULAR; INTRALESIONAL; INTRAMUSCULAR; SOFT TISSUE ONCE
Status: COMPLETED | OUTPATIENT
Start: 2019-04-04 | End: 2019-04-04

## 2019-04-04 RX ORDER — 0.9 % SODIUM CHLORIDE 0.9 %
2000 INTRAVENOUS SOLUTION INTRAVENOUS ONCE
Status: COMPLETED | OUTPATIENT
Start: 2019-04-04 | End: 2019-04-04

## 2019-04-04 RX ORDER — PREDNISONE 20 MG/1
TABLET ORAL
Qty: 20 TABLET | Refills: 0 | Status: SHIPPED | OUTPATIENT
Start: 2019-04-04 | End: 2019-04-13

## 2019-04-04 RX ORDER — BENZONATATE 100 MG/1
100-200 CAPSULE ORAL 3 TIMES DAILY PRN
Qty: 60 CAPSULE | Refills: 0 | Status: SHIPPED | OUTPATIENT
Start: 2019-04-04 | End: 2019-04-04

## 2019-04-04 RX ORDER — FAMOTIDINE 40 MG/1
40 TABLET, FILM COATED ORAL NIGHTLY
Qty: 14 TABLET | Refills: 0 | Status: SHIPPED | OUTPATIENT
Start: 2019-04-04 | End: 2019-04-22 | Stop reason: ALTCHOICE

## 2019-04-04 RX ORDER — MORPHINE SULFATE 2 MG/ML
4 INJECTION, SOLUTION INTRAMUSCULAR; INTRAVENOUS ONCE
Status: COMPLETED | OUTPATIENT
Start: 2019-04-04 | End: 2019-04-04

## 2019-04-04 RX ORDER — AZITHROMYCIN 1 G
1 PACKET (EA) ORAL ONCE
Status: ON HOLD | COMMUNITY
Start: 2019-03-30 | End: 2019-04-05 | Stop reason: ALTCHOICE

## 2019-04-04 RX ORDER — AZITHROMYCIN 500 MG/1
2 TABLET, FILM COATED ORAL
COMMUNITY
Start: 2016-01-19 | End: 2019-04-04 | Stop reason: SINTOL

## 2019-04-04 RX ORDER — ONDANSETRON 2 MG/ML
4 INJECTION INTRAMUSCULAR; INTRAVENOUS ONCE
Status: COMPLETED | OUTPATIENT
Start: 2019-04-04 | End: 2019-04-04

## 2019-04-04 RX ORDER — MORPHINE SULFATE 2 MG/ML
8 INJECTION, SOLUTION INTRAMUSCULAR; INTRAVENOUS ONCE
Status: COMPLETED | OUTPATIENT
Start: 2019-04-04 | End: 2019-04-04

## 2019-04-04 RX ADMIN — MORPHINE SULFATE 8 MG: 2 INJECTION, SOLUTION INTRAMUSCULAR; INTRAVENOUS at 20:23

## 2019-04-04 RX ADMIN — METHYLPREDNISOLONE ACETATE 80 MG: 80 INJECTION, SUSPENSION INTRA-ARTICULAR; INTRALESIONAL; INTRAMUSCULAR; SOFT TISSUE at 11:29

## 2019-04-04 RX ADMIN — ONDANSETRON 4 MG: 2 INJECTION INTRAMUSCULAR; INTRAVENOUS at 19:21

## 2019-04-04 RX ADMIN — ACETAMINOPHEN 1000 MG: 500 TABLET ORAL at 19:20

## 2019-04-04 RX ADMIN — MORPHINE SULFATE 4 MG: 2 INJECTION, SOLUTION INTRAMUSCULAR; INTRAVENOUS at 22:55

## 2019-04-04 RX ADMIN — DIPHENHYDRAMINE HYDROCHLORIDE 50 MG: 50 INJECTION, SOLUTION INTRAMUSCULAR; INTRAVENOUS at 20:23

## 2019-04-04 RX ADMIN — POTASSIUM CHLORIDE 40 MEQ: 20 TABLET, EXTENDED RELEASE ORAL at 23:50

## 2019-04-04 RX ADMIN — KETOROLAC TROMETHAMINE 15 MG: 30 INJECTION INTRAMUSCULAR; INTRAVENOUS at 19:21

## 2019-04-04 RX ADMIN — SODIUM CHLORIDE 1000 ML: 9 INJECTION, SOLUTION INTRAVENOUS at 19:15

## 2019-04-04 RX ADMIN — DIPHENHYDRAMINE HYDROCHLORIDE 50 MG: 50 INJECTION INTRAMUSCULAR; INTRAVENOUS at 22:55

## 2019-04-04 RX ADMIN — METHYLPREDNISOLONE SODIUM SUCCINATE 125 MG: 125 INJECTION, POWDER, FOR SOLUTION INTRAMUSCULAR; INTRAVENOUS at 20:34

## 2019-04-04 ASSESSMENT — PAIN SCALES - GENERAL
PAINLEVEL_OUTOF10: 1
PAINLEVEL_OUTOF10: 1
PAINLEVEL_OUTOF10: 10
PAINLEVEL_OUTOF10: 1
PAINLEVEL_OUTOF10: 10

## 2019-04-04 ASSESSMENT — PAIN DESCRIPTION - DESCRIPTORS: DESCRIPTORS: SHARP

## 2019-04-04 ASSESSMENT — PATIENT HEALTH QUESTIONNAIRE - PHQ9
SUM OF ALL RESPONSES TO PHQ9 QUESTIONS 1 & 2: 0
2. FEELING DOWN, DEPRESSED OR HOPELESS: 0
1. LITTLE INTEREST OR PLEASURE IN DOING THINGS: 0
SUM OF ALL RESPONSES TO PHQ QUESTIONS 1-9: 0
SUM OF ALL RESPONSES TO PHQ QUESTIONS 1-9: 0

## 2019-04-04 ASSESSMENT — PAIN DESCRIPTION - PROGRESSION
CLINICAL_PROGRESSION: GRADUALLY IMPROVING
CLINICAL_PROGRESSION: GRADUALLY IMPROVING
CLINICAL_PROGRESSION: NOT CHANGED

## 2019-04-04 ASSESSMENT — PAIN DESCRIPTION - LOCATION: LOCATION: GENERALIZED

## 2019-04-04 ASSESSMENT — PAIN SCALES - WONG BAKER: WONGBAKER_NUMERICALRESPONSE: 0

## 2019-04-04 NOTE — ED NOTES
Assumed care of patient. Pt lying in bed in no apparent distress. Father at bedside.      Carlton Wei, VALERIE  04/04/19 1834

## 2019-04-04 NOTE — ED PROVIDER NOTES
HPI:  19,   Time: 6:44 PM         Teresa Apgar is a 35 y.o. female presenting to the ED for vomiting and fever and chills and swollen lymph nodes, beginning proximally one week but worse over the last day ago. The complaint has been constant, moderate in severity, and worsened by nothing. History Limited because of the patient's distress and not cooperating with questions. Recently had a full medical workup including a CAT scan of the abdomen and pelvis and chest along with comprehensive laboratory evaluation all of which was negative    ROS:   Pertinent positives and negatives are stated within HPI, all other systems reviewed and are negative.  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 Past Surgical History:  has a past surgical history that includes fracture surgery (3/18/14) and Tibia fracture surgery (Right, [id number]). Social History:  reports that she has quit smoking. Her smoking use included cigars and cigarettes. She started smoking about 3 years ago. She has a 0.20 pack-year smoking history. She has never used smokeless tobacco. She reports that she drinks alcohol. She reports that she has current or past drug history. Drug: Marijuana. Family History: family history is not on file. The patients home medications have been reviewed. Allergies: Codeine; Penicillins;  Toradol [ketorolac tromethamine]; and Azithromycin    -------------------------------------------------- RESULTS -------------------------------------------------  All laboratory and radiology results have been personally reviewed by myself   LABS:  Results for orders placed or performed during the hospital encounter of 19   Rapid influenza A/B antigens   Result Value Ref Range    Influenza A by PCR Not Detected Not Detected    Influenza B by PCR Not Detected Not Detected   CBC Auto Differential   Result Value Ref Range    WBC 12.2 (H) 4.5 - 11.5 E9/L    RBC 4.49 3.50 - 5.50 E12/L    Hemoglobin 12.8 11.5 - 15.5 g/dL    Hematocrit 37.7 34.0 - 48.0 %    MCV 84.0 80.0 - 99.9 fL    MCH 28.5 26.0 - 35.0 pg    MCHC 34.0 32.0 - 34.5 %    RDW 17.4 (H) 11.5 - 15.0 fL    Platelets 902 551 - 243 E9/L    MPV 11.8 7.0 - 12.0 fL    Neutrophils % 80.6 (H) 43.0 - 80.0 %    Immature Granulocytes % 2.6 0.0 - 5.0 %    Lymphocytes % 4.5 (L) 20.0 - 42.0 %    Monocytes % 3.6 2.0 - 12.0 %    Eosinophils % 8.5 (H) 0.0 - 6.0 %    Basophils % 0.2 0.0 - 2.0 %    Neutrophils # 9.79 (H) 1.80 - 7.30 E9/L    Immature Granulocytes # 0.32 E9/L    Lymphocytes # 0.55 (L) 1.50 - 4.00 E9/L    Monocytes # 0.44 0.10 - 0.95 E9/L    Eosinophils # 1.04 (H) 0.05 - 0.50 E9/L    Basophils # 0.03 0.00 - 0.20 E9/L    RBC Morphology Normal    Comprehensive Metabolic Panel w/ Reflex to MG   Result Value Ref Range    Sodium 135 132 - 146 mmol/L    Potassium reflex Magnesium 3.4 (L) 3.5 - 5.0 mmol/L    Chloride 104 98 - 107 mmol/L    CO2 23 22 - 29 mmol/L    Anion Gap 8 7 - 16 mmol/L    Glucose 116 (H) 74 - 99 mg/dL    BUN 7 6 - 20 mg/dL    CREATININE 1.1 (H) 0.5 - 1.0 mg/dL    GFR Non-African American >60 >=60 mL/min/1.73    GFR African American >60     Calcium 8.2 (L) 8.6 - 10.2 mg/dL    Total Protein 6.7 6.4 - 8.3 g/dL    Alb 3.3 (L) 3.5 - 5.2 g/dL    Total Bilirubin 0.4 0.0 - 1.2 mg/dL    Alkaline Phosphatase 132 (H) 35 - 104 U/L     (H) 0 - 32 U/L    AST 71 (H) 0 - 31 U/L   Lactic Acid, Plasma   Result Value Ref Range    Lactic Acid 2.0 0.5 - 2.2 mmol/L   Lipase   Result Value Ref Range    Lipase 7 (L) 13 - 60 U/L   Urinalysis   Result Value Ref Range    Color, UA Yellow Straw/Yellow    Clarity, UA Clear Clear    Glucose, Ur Negative Negative mg/dL    Bilirubin Urine Negative Negative    Ketones, Urine TRACE (A) Negative mg/dL    Specific Gravity, UA 1.025 1.005 - 1.030    Blood, Urine Negative Negative    pH, UA 5.5 5.0 - 9.0    Protein, UA TRACE Negative mg/dL    Urobilinogen, Urine 0.2 <2.0 E.U./dL    Nitrite, Urine Negative Negative    Leukocyte Esterase, Urine Negative Negative   Magnesium   Result Value Ref Range    Magnesium 1.6 1.6 - 2.6 mg/dL   Microscopic Urinalysis   Result Value Ref Range    WBC, UA 0-1 0 - 5 /HPF    RBC, UA NONE 0 - 2 /HPF    Epi Cells RARE /HPF    Bacteria, UA NONE /HPF       RADIOLOGY:  Interpreted by Radiologist.  US ABDOMEN LIMITED   Final Result   Unremarkable ultrasound of the right upper quadrant.          ------------------------- NURSING NOTES AND VITALS REVIEWED ---------------------------   The nursing notes within the ED encounter and vital signs as below have been reviewed. /66   Pulse 99   Temp 98.3 °F (36.8 °C) (Oral)   Resp 20   Ht 5' 4\" (1.626 m)   Wt 260 lb (117.9 kg)   LMP 03/12/2019 (Approximate)   SpO2 92%   BMI 44.63 kg/m²   Oxygen Saturation Interpretation: Normal      ---------------------------------------------------PHYSICAL EXAM--------------------------------------      Constitutional/General: Alert and oriented x3, moderately ill-appearing, non toxic in moderate distress  Head: NC/AT  Eyes: PERRL, EOMI  Mouth: Oropharynx clear, handling secretions, no trismus/I mucous membranes  Neck: Supple, full ROM, no meningeal signs  Pulmonary: Lungs clear to auscultation bilaterally, no wheezes, rales, or rhonchi. Not in respiratory distress  Cardiovascular:  Tachycardic with regular rhythm, no murmurs, gallops, or rubs. 2+ distal pulses  Abdomen: Soft, mild diffuse tenderness to palpation, non distended,   Extremities: Moves all extremities x 4.  Warm and well perfused  Skin: warm and dry without rash  Neurologic: GCS 15, cranial nerves II through XII and coordination grossly intact, motor sensation intact throughout upper and lower extremities  Psych: Normal Affect      ------------------------------ ED COURSE/MEDICAL DECISION MAKING----------------------  Medications   HYDROcodone-acetaminophen (NORCO) 5-325 MG per tablet 1 tablet (has no administration in time range)   ondansetron (ZOFRAN) injection 4 mg (has no administration in time range)   sodium chloride flush 0.9 % injection 10 mL (has no administration in time range)   sodium chloride flush 0.9 % injection 10 mL (has no administration in time range)   acetaminophen (TYLENOL) tablet 650 mg (has no administration in time range)   enoxaparin (LOVENOX) injection 40 mg (has no administration in time range)   0.9 % sodium chloride bolus (0 mLs Intravenous Stopped 4/4/19 2130)   ondansetron (ZOFRAN) injection 4 mg (4 mg Intravenous Given 4/4/19 1921)   ketorolac (TORADOL) injection 15 mg (15 mg Intravenous Given 4/4/19 1921)   acetaminophen (TYLENOL) tablet 1,000 mg (1,000 mg Oral Given 4/4/19 1920)   morphine (PF) injection 8 mg (8 mg Intravenous Given 4/4/19 2023)   diphenhydrAMINE (BENADRYL) injection 50 mg (50 mg Intravenous Given 4/4/19 2023)   methylPREDNISolone sodium (SOLU-MEDROL) injection 125 mg (125 mg Intravenous Given 4/4/19 2034)   diphenhydrAMINE (BENADRYL) injection 50 mg (50 mg Intravenous Given 4/4/19 2255)   morphine (PF) injection 4 mg (4 mg Intravenous Given 4/4/19 2255)   potassium chloride (KLOR-CON M) extended release tablet 40 mEq (40 mEq Oral Given 4/4/19 2350)   0.9 % sodium chloride bolus (1,000 mLs Intravenous New Bag 4/5/19 0012)   famotidine (PEPCID) injection 20 mg (20 mg Intravenous Given 4/5/19 0056)         Medical Decision Making:    Acute febrile illness rule out flu strep and other    Counseling: The emergency provider has spoken with the patient and discussed todays results, in addition to providing specific details for the plan of care and counseling regarding the diagnosis and prognosis.   Questions are answered at this time and they are agreeable with the plan.      --------------------------------- IMPRESSION AND DISPOSITION ---------------------------------    IMPRESSION  1. Dehydration Stable   2. Fever in other diseases Stable   3. Non-intractable vomiting with nausea, unspecified vomiting type Stable   4.  Lymphadenopathy Stable       DISPOSITION  Disposition: Admit to med/surg floor  Patient condition is stable        ER course: Vital signs stabilized with 3 L of IV fluid and pain controlled with morphine and Toradol and itching required Benadryl and Solu-Medrol and Pepcid     Critical care time of 45 minutes     Jose Wisdom MD  04/05/19 5083

## 2019-04-04 NOTE — PROGRESS NOTES
Aleksandra Campbell   1985    Chief Complaint:     Chief Complaint   Patient presents with    Established New Doctor    Lymphadenopathy     seen in the ED     Rash     all over after the 2nd visit to the ED     HPI  Persistent rash and LAD after ED treatment x2. Unknown trigger thus far. Has enlargement in parotid area. Has had chills and fatigue and diffuse rash eruption. ED labs unrevealing. 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     TIBIA FRACTURE SURGERY Right [id number]       Social History     Socioeconomic History    Marital status: Single     Spouse name: None    Number of children: None    Years of education: None    Highest education level: None   Occupational History    None   Social Needs    Financial resource strain: None    Food insecurity:     Worry: None     Inability: None    Transportation needs:     Medical: None     Non-medical: None   Tobacco Use    Smoking status: Former Smoker     Packs/day: 0.10     Years: 2.00     Pack years: 0.20     Types: Cigars, Cigarettes     Start date:     Smokeless tobacco: Never Used    Tobacco comment: 1 pack of cigars a day - cigarettes only when she is stressed   Substance and Sexual Activity    Alcohol use: Yes     Comment: rarely    Drug use: Yes     Types: Marijuana     Comment: once in awhile    Sexual activity: Not Currently     Partners: Male   Lifestyle    Physical activity:     Days per week: None     Minutes per session: None    Stress: None   Relationships    Social connections:     Talks on phone: None     Gets together: None     Attends Latter day service: None     Active member of club or organization: None     Attends meetings of clubs or organizations: None     Relationship status: None    Intimate partner violence:     Fear of current or ex partner: None     Emotionally abused: None     Physically abused: None     Forced sexual activity: None   Other Topics Concern    None   Social History Narrative    None       No family history on file. Current Outpatient Medications   Medication Sig Dispense Refill    cetirizine (ZYRTEC) 10 MG tablet Take 1 tablet by mouth daily 30 tablet 0    ibuprofen (ADVIL;MOTRIN) 800 MG tablet Take 1 tablet by mouth every 6 hours as needed for Pain 20 tablet 3    albuterol sulfate HFA (PROVENTIL HFA) 108 (90 Base) MCG/ACT inhaler Inhale 2 puffs into the lungs every 4 hours as needed for Wheezing 1 Inhaler 1    acyclovir (ZOVIRAX) 400 MG tablet Take 400 mg by mouth 2 times daily       benzonatate (TESSALON) 100 MG capsule Take 100 mg by mouth 3 times daily as needed for Cough      diphenhydrAMINE (BENADRYL) 25 MG capsule Take 25 mg by mouth every 6 hours as needed for Itching      pantoprazole (PROTONIX) 40 MG tablet Take 1 tablet by mouth daily (with breakfast) 30 tablet 3    escitalopram (LEXAPRO) 10 MG tablet Take 1 tablet by mouth daily 30 tablet 3    valACYclovir (VALTREX) 500 MG tablet Take 500 mg by mouth daily        No current facility-administered medications for this visit. Allergies   Allergen Reactions    Latex      Pt develops rash and instructed per dr to use non latex items    Codeine Other (See Comments)     Gets \"zonked out\"    Penicillins Other (See Comments)     Gets \"zonked out\"    Toradol [Ketorolac Tromethamine] Itching and Swelling     Lip swelling    Azithromycin Itching and Rash       REVIEW OF SYSTEMS  Review of Systems   Constitutional: Positive for chills, fatigue and fever. Negative for activity change, appetite change, diaphoresis and unexpected weight change. HENT: Positive for congestion and rhinorrhea.  Negative for ear discharge, ear pain, postnasal drip, sinus pressure, sinus pain, sore throat and trouble swallowing. Eyes: Negative for redness and visual disturbance. Respiratory: Positive for cough. Negative for chest tightness, shortness of breath and wheezing. Cardiovascular: Negative for chest pain, palpitations and leg swelling. Gastrointestinal: Negative for abdominal pain, blood in stool, constipation, diarrhea, nausea and vomiting. Musculoskeletal: Negative for arthralgias, back pain and myalgias. Skin: Positive for rash. Negative for color change and pallor. Neurological: Negative for dizziness, syncope, light-headedness, numbness and headaches. Psychiatric/Behavioral: Negative for confusion and dysphoric mood. The patient is not nervous/anxious. All other systems reviewed and are negative. PHYSICAL EXAM  /76   Pulse 95   Temp 98.7 °F (37.1 °C)   Resp 16   Wt 256 lb (116.1 kg)   LMP 03/12/2019   SpO2 98%   BMI 43.94 kg/m²   Physical Exam   Constitutional: She appears well-developed and well-nourished. No distress. HENT:   Head: Normocephalic and atraumatic. Right Ear: Hearing, tympanic membrane and ear canal normal.   Left Ear: Hearing, tympanic membrane and ear canal normal.   Nose: Nose normal. Right sinus exhibits no maxillary sinus tenderness and no frontal sinus tenderness. Left sinus exhibits no maxillary sinus tenderness and no frontal sinus tenderness. Mouth/Throat: Uvula is midline, oropharynx is clear and moist and mucous membranes are normal. No oropharyngeal exudate, posterior oropharyngeal edema, posterior oropharyngeal erythema or tonsillar abscesses. Eyes: Conjunctivae are normal.   Neck: Normal range of motion. Neck supple. No JVD present. No thyromegaly present. Cardiovascular: Normal rate and regular rhythm. Pulmonary/Chest: Effort normal and breath sounds normal. She has no wheezes. She has no rales. Abdominal: Soft. Bowel sounds are normal.   Musculoskeletal: She exhibits no edema or tenderness.    Lymphadenopathy: Head (right side): Submandibular adenopathy present. No submental, no preauricular, no posterior auricular and no occipital adenopathy present. Head (left side): Submandibular adenopathy present. No submental, no preauricular, no posterior auricular and no occipital adenopathy present. She has no cervical adenopathy. Vitals reviewed. ASSESSMENT/PLAN:     Diagnosis Orders   1. Parotid hypertrophy  MUMPS ANTIBODY, IGM    CBC Auto Differential   2. Allergic reaction, initial encounter  methylPREDNISolone acetate (DEPO-MEDROL) injection 80 mg    predniSONE (DELTASONE) 20 MG tablet   3. Lymphadenopathy of head and neck         Reviewed age and gender appropriate health screening exams and vaccinations. Advised patient regardingimportance of keeping up with recommended health maintenance and to schedule as soon as possible if overdue, as this is important in assessing for undiagnosed pathology, especially cancer. Patient verbalizes understandingand agrees. Call or go to ED immediately if symptoms worsen or persist.  No follow-ups on file. Sooner if necessary. Counseled regarding above diagnosis, including possible risks and complications,  especially if leftuncontrolled. Counseled regarding the possible side effects, risks, benefits and alternatives to treatment; patient and/or guardian verbalizes understanding. Advised patient to call with any new medication issues. Allquestions answered.     Future Appointments   Date Time Provider Jeremi Nascimento   5/2/2019  8:30 AM Anika Frazier MD [facility]   6/3/2019  8:00 AM [facility] Juanita Mckeon MD

## 2019-04-04 NOTE — LETTER
[place]  [facility] [place] [address]  Phone: [phone]            April 9, 2019     Patient: Zulma Mills   YOB: 1985   Date of Visit: 4/4/2019       To Whom It May Concern:     Zulma Mills has been under our care from 4/4/19 - 4/9/19, here at [address] [facility]. Pt may return back to work starting 4/10/19. If you have any questions or concerns, please don't hesitate to call.     Sincerely,    Tisha Gerber RN

## 2019-04-05 PROBLEM — K75.9 HEPATITIS: Status: ACTIVE | Noted: 2019-04-05

## 2019-04-05 PROBLEM — E86.0 DEHYDRATION: Status: ACTIVE | Noted: 2019-04-05

## 2019-04-05 PROBLEM — R59.1 DIFFUSE LYMPHADENOPATHY: Status: ACTIVE | Noted: 2019-04-05

## 2019-04-05 PROBLEM — T50.905A ADVERSE DRUG REACTION: Status: ACTIVE | Noted: 2019-04-05

## 2019-04-05 PROBLEM — R11.2 NAUSEA & VOMITING: Status: ACTIVE | Noted: 2019-04-05

## 2019-04-05 LAB
ALBUMIN SERPL-MCNC: 3.4 G/DL (ref 3.5–5.2)
ALP BLD-CCNC: 110 U/L (ref 35–104)
ALT SERPL-CCNC: 91 U/L (ref 0–32)
AST SERPL-CCNC: 42 U/L (ref 0–31)
BACTERIA: NORMAL /HPF
BILIRUB SERPL-MCNC: 0.3 MG/DL (ref 0–1.2)
BILIRUBIN DIRECT: <0.2 MG/DL (ref 0–0.3)
BILIRUBIN URINE: NEGATIVE
BILIRUBIN, INDIRECT: ABNORMAL MG/DL (ref 0–1)
BLOOD, URINE: NEGATIVE
CLARITY: CLEAR
COLOR: YELLOW
EPITHELIAL CELLS, UA: NORMAL /HPF
FILM ARRAY ADENOVIRUS: ABNORMAL
FILM ARRAY BORDETELLA PERTUSSIS: ABNORMAL
FILM ARRAY CHLAMYDOPHILIA PNEUMONIAE: ABNORMAL
FILM ARRAY CORONAVIRUS 229E: ABNORMAL
FILM ARRAY CORONAVIRUS HKU1: ABNORMAL
FILM ARRAY CORONAVIRUS NL63: ABNORMAL
FILM ARRAY CORONAVIRUS OC43: ABNORMAL
FILM ARRAY INFLUENZA A VIRUS 09H1: ABNORMAL
FILM ARRAY INFLUENZA A VIRUS H1: ABNORMAL
FILM ARRAY INFLUENZA A VIRUS H3: ABNORMAL
FILM ARRAY INFLUENZA A VIRUS: ABNORMAL
FILM ARRAY INFLUENZA B: ABNORMAL
FILM ARRAY METAPNEUMOVIRUS: ABNORMAL
FILM ARRAY MYCOPLASMA PNEUMONIAE: ABNORMAL
FILM ARRAY PARAINFLUENZA VIRUS 1: ABNORMAL
FILM ARRAY PARAINFLUENZA VIRUS 3: ABNORMAL
FILM ARRAY PARAINFLUENZA VIRUS 4: ABNORMAL
FILM ARRAY RESPIRATORY SYNCITIAL VIRUS: ABNORMAL
FILM ARRAY RHINOVIRUS/ENTEROVIRUS: ABNORMAL
GLUCOSE URINE: NEGATIVE MG/DL
KETONES, URINE: ABNORMAL MG/DL
LEUKOCYTE ESTERASE, URINE: NEGATIVE
NITRITE, URINE: NEGATIVE
ORGANISM: ABNORMAL
PH UA: 5.5 (ref 5–9)
PROTEIN UA: ABNORMAL MG/DL
RBC UA: NORMAL /HPF (ref 0–2)
SPECIFIC GRAVITY UA: 1.02 (ref 1–1.03)
TOTAL PROTEIN: 7.1 G/DL (ref 6.4–8.3)
UROBILINOGEN, URINE: 0.2 E.U./DL
WBC UA: NORMAL /HPF (ref 0–5)

## 2019-04-05 PROCEDURE — G0378 HOSPITAL OBSERVATION PER HR: HCPCS

## 2019-04-05 PROCEDURE — 6370000000 HC RX 637 (ALT 250 FOR IP): Performed by: EMERGENCY MEDICINE

## 2019-04-05 PROCEDURE — 36415 COLL VENOUS BLD VENIPUNCTURE: CPT

## 2019-04-05 PROCEDURE — 80074 ACUTE HEPATITIS PANEL: CPT

## 2019-04-05 PROCEDURE — 86611 BARTONELLA ANTIBODY: CPT

## 2019-04-05 PROCEDURE — 96375 TX/PRO/DX INJ NEW DRUG ADDON: CPT

## 2019-04-05 PROCEDURE — 86481 TB AG RESPONSE T-CELL SUSP: CPT

## 2019-04-05 PROCEDURE — 86777 TOXOPLASMA ANTIBODY: CPT

## 2019-04-05 PROCEDURE — 86663 EPSTEIN-BARR ANTIBODY: CPT

## 2019-04-05 PROCEDURE — 96376 TX/PRO/DX INJ SAME DRUG ADON: CPT

## 2019-04-05 PROCEDURE — 81001 URINALYSIS AUTO W/SCOPE: CPT

## 2019-04-05 PROCEDURE — 86664 EPSTEIN-BARR NUCLEAR ANTIGEN: CPT

## 2019-04-05 PROCEDURE — 82164 ANGIOTENSIN I ENZYME TEST: CPT

## 2019-04-05 PROCEDURE — 86703 HIV-1/HIV-2 1 RESULT ANTBDY: CPT

## 2019-04-05 PROCEDURE — 96361 HYDRATE IV INFUSION ADD-ON: CPT

## 2019-04-05 PROCEDURE — 94664 DEMO&/EVAL PT USE INHALER: CPT

## 2019-04-05 PROCEDURE — 2580000003 HC RX 258: Performed by: INTERNAL MEDICINE

## 2019-04-05 PROCEDURE — 99222 1ST HOSP IP/OBS MODERATE 55: CPT | Performed by: INTERNAL MEDICINE

## 2019-04-05 PROCEDURE — 6360000002 HC RX W HCPCS: Performed by: EMERGENCY MEDICINE

## 2019-04-05 PROCEDURE — 87088 URINE BACTERIA CULTURE: CPT

## 2019-04-05 PROCEDURE — 6370000000 HC RX 637 (ALT 250 FOR IP): Performed by: INTERNAL MEDICINE

## 2019-04-05 PROCEDURE — 80076 HEPATIC FUNCTION PANEL: CPT

## 2019-04-05 PROCEDURE — 86592 SYPHILIS TEST NON-TREP QUAL: CPT

## 2019-04-05 PROCEDURE — 2500000003 HC RX 250 WO HCPCS: Performed by: EMERGENCY MEDICINE

## 2019-04-05 PROCEDURE — 86778 TOXOPLASMA ANTIBODY IGM: CPT

## 2019-04-05 PROCEDURE — 2580000003 HC RX 258: Performed by: EMERGENCY MEDICINE

## 2019-04-05 PROCEDURE — 86665 EPSTEIN-BARR CAPSID VCA: CPT

## 2019-04-05 PROCEDURE — 6360000002 HC RX W HCPCS: Performed by: INTERNAL MEDICINE

## 2019-04-05 PROCEDURE — 86762 RUBELLA ANTIBODY: CPT

## 2019-04-05 RX ORDER — ONDANSETRON 2 MG/ML
4 INJECTION INTRAMUSCULAR; INTRAVENOUS EVERY 6 HOURS PRN
Status: DISCONTINUED | OUTPATIENT
Start: 2019-04-05 | End: 2019-04-10 | Stop reason: HOSPADM

## 2019-04-05 RX ORDER — HYDROCODONE BITARTRATE AND ACETAMINOPHEN 5; 325 MG/1; MG/1
1 TABLET ORAL EVERY 6 HOURS PRN
Status: DISCONTINUED | OUTPATIENT
Start: 2019-04-05 | End: 2019-04-10 | Stop reason: HOSPADM

## 2019-04-05 RX ORDER — HYDROCODONE BITARTRATE AND ACETAMINOPHEN 5; 325 MG/1; MG/1
1 TABLET ORAL ONCE
Status: COMPLETED | OUTPATIENT
Start: 2019-04-05 | End: 2019-04-05

## 2019-04-05 RX ORDER — POTASSIUM CHLORIDE 20 MEQ/1
40 TABLET, EXTENDED RELEASE ORAL ONCE
Status: COMPLETED | OUTPATIENT
Start: 2019-04-05 | End: 2019-04-05

## 2019-04-05 RX ORDER — 0.9 % SODIUM CHLORIDE 0.9 %
1000 INTRAVENOUS SOLUTION INTRAVENOUS ONCE
Status: COMPLETED | OUTPATIENT
Start: 2019-04-05 | End: 2019-04-05

## 2019-04-05 RX ORDER — SODIUM CHLORIDE 0.9 % (FLUSH) 0.9 %
10 SYRINGE (ML) INJECTION EVERY 12 HOURS SCHEDULED
Status: DISCONTINUED | OUTPATIENT
Start: 2019-04-05 | End: 2019-04-10 | Stop reason: HOSPADM

## 2019-04-05 RX ORDER — DIPHENHYDRAMINE HYDROCHLORIDE 50 MG/ML
25 INJECTION INTRAMUSCULAR; INTRAVENOUS EVERY 6 HOURS PRN
Status: DISCONTINUED | OUTPATIENT
Start: 2019-04-05 | End: 2019-04-10 | Stop reason: HOSPADM

## 2019-04-05 RX ORDER — ONDANSETRON 2 MG/ML
4 INJECTION INTRAMUSCULAR; INTRAVENOUS ONCE
Status: COMPLETED | OUTPATIENT
Start: 2019-04-05 | End: 2019-04-05

## 2019-04-05 RX ORDER — ALPRAZOLAM 0.25 MG/1
0.25 TABLET ORAL 4 TIMES DAILY PRN
Status: DISCONTINUED | OUTPATIENT
Start: 2019-04-05 | End: 2019-04-10 | Stop reason: HOSPADM

## 2019-04-05 RX ORDER — KETOROLAC TROMETHAMINE 30 MG/ML
15 INJECTION, SOLUTION INTRAMUSCULAR; INTRAVENOUS EVERY 6 HOURS PRN
Status: DISCONTINUED | OUTPATIENT
Start: 2019-04-05 | End: 2019-04-06

## 2019-04-05 RX ORDER — SODIUM CHLORIDE 0.9 % (FLUSH) 0.9 %
10 SYRINGE (ML) INJECTION PRN
Status: DISCONTINUED | OUTPATIENT
Start: 2019-04-05 | End: 2019-04-10 | Stop reason: HOSPADM

## 2019-04-05 RX ORDER — ACETAMINOPHEN 325 MG/1
650 TABLET ORAL EVERY 4 HOURS PRN
Status: DISCONTINUED | OUTPATIENT
Start: 2019-04-05 | End: 2019-04-10 | Stop reason: HOSPADM

## 2019-04-05 RX ORDER — ALBUTEROL SULFATE 90 UG/1
2 AEROSOL, METERED RESPIRATORY (INHALATION) EVERY 4 HOURS PRN
Status: DISCONTINUED | OUTPATIENT
Start: 2019-04-05 | End: 2019-04-10 | Stop reason: HOSPADM

## 2019-04-05 RX ADMIN — HYDROCODONE BITARTRATE AND ACETAMINOPHEN 1 TABLET: 5; 325 TABLET ORAL at 20:58

## 2019-04-05 RX ADMIN — FAMOTIDINE 20 MG: 10 INJECTION, SOLUTION INTRAVENOUS at 00:56

## 2019-04-05 RX ADMIN — DIPHENHYDRAMINE HYDROCHLORIDE 25 MG: 50 INJECTION, SOLUTION INTRAMUSCULAR; INTRAVENOUS at 09:52

## 2019-04-05 RX ADMIN — ALBUTEROL SULFATE 2 PUFF: 90 AEROSOL, METERED RESPIRATORY (INHALATION) at 22:36

## 2019-04-05 RX ADMIN — SODIUM CHLORIDE 1000 ML: 9 INJECTION, SOLUTION INTRAVENOUS at 00:12

## 2019-04-05 RX ADMIN — ALPRAZOLAM 0.25 MG: 0.25 TABLET ORAL at 15:21

## 2019-04-05 RX ADMIN — ALPRAZOLAM 0.25 MG: 0.25 TABLET ORAL at 22:01

## 2019-04-05 RX ADMIN — HYDROCODONE BITARTRATE AND ACETAMINOPHEN 1 TABLET: 5; 325 TABLET ORAL at 01:55

## 2019-04-05 RX ADMIN — DIPHENHYDRAMINE HYDROCHLORIDE 25 MG: 50 INJECTION, SOLUTION INTRAMUSCULAR; INTRAVENOUS at 16:22

## 2019-04-05 RX ADMIN — POTASSIUM CHLORIDE 40 MEQ: 20 TABLET, EXTENDED RELEASE ORAL at 09:53

## 2019-04-05 RX ADMIN — Medication 10 ML: at 20:58

## 2019-04-05 RX ADMIN — SODIUM CHLORIDE 1000 ML: 9 INJECTION, SOLUTION INTRAVENOUS at 03:45

## 2019-04-05 RX ADMIN — ONDANSETRON 4 MG: 2 INJECTION INTRAMUSCULAR; INTRAVENOUS at 01:55

## 2019-04-05 ASSESSMENT — PAIN SCALES - GENERAL
PAINLEVEL_OUTOF10: 1
PAINLEVEL_OUTOF10: 0
PAINLEVEL_OUTOF10: 1
PAINLEVEL_OUTOF10: 1
PAINLEVEL_OUTOF10: 0
PAINLEVEL_OUTOF10: 4

## 2019-04-05 ASSESSMENT — PAIN DESCRIPTION - PROGRESSION
CLINICAL_PROGRESSION: GRADUALLY WORSENING
CLINICAL_PROGRESSION: NOT CHANGED

## 2019-04-05 ASSESSMENT — PAIN - FUNCTIONAL ASSESSMENT: PAIN_FUNCTIONAL_ASSESSMENT: ACTIVITIES ARE NOT PREVENTED

## 2019-04-05 ASSESSMENT — PAIN DESCRIPTION - DESCRIPTORS: DESCRIPTORS: ACHING;DISCOMFORT

## 2019-04-05 ASSESSMENT — PAIN DESCRIPTION - FREQUENCY: FREQUENCY: INTERMITTENT

## 2019-04-05 ASSESSMENT — PAIN DESCRIPTION - LOCATION: LOCATION: HEAD;NECK

## 2019-04-05 ASSESSMENT — PAIN DESCRIPTION - ONSET: ONSET: GRADUAL

## 2019-04-05 ASSESSMENT — PAIN DESCRIPTION - PAIN TYPE: TYPE: ACUTE PAIN

## 2019-04-05 NOTE — H&P
Take 3 tablets by mouth daily for 3 days, THEN 2 tablets daily for 3 days, THEN 1 tablet daily for 3 days. Patient taking differently: Take 3 tablets by mouth daily for 3 days, THEN 2 tablets daily for 3 days, THEN 1 tablet daily for 3 days. to start on 4/5/19 4/4/19 4/13/19 Yes Tomasz Dyer MD   famotidine (PEPCID) 40 MG tablet Take 1 tablet by mouth nightly 4/4/19  Yes Tomasz Dyer MD   cetirizine (ZYRTEC) 10 MG tablet Take 1 tablet by mouth daily 3/29/19 4/28/19 Yes ARNOLD Joe CNP   benzonatate (TESSALON PERLES) 100 MG capsule Take 2 capsules by mouth 3 times daily as needed for Cough 3/29/19 4/5/19 Yes ARNOLD Joe CNP   ibuprofen (ADVIL;MOTRIN) 800 MG tablet Take 1 tablet by mouth every 6 hours as needed for Pain 3/29/19 4/4/19 Yes ARNOLD Joe CNP   diclofenac (VOLTAREN) 50 MG EC tablet take 1 tablet by mouth twice a day with meals 10/25/18  Yes Mildred Cardenas PA-C   albuterol sulfate HFA (PROVENTIL HFA) 108 (90 Base) MCG/ACT inhaler Inhale 2 puffs into the lungs every 4 hours as needed for Wheezing 10/12/18 10/12/19 Yes Destiny Wallace DO   valACYclovir (VALTREX) 500 MG tablet Take 500 mg by mouth daily  7/11/18  Yes Historical Provider, MD   acyclovir (ZOVIRAX) 400 MG tablet Take 400 mg by mouth 2 times daily  7/11/18  Yes Historical Provider, MD       Allergies:  Codeine; Penicillins; Toradol [ketorolac tromethamine]; and Azithromycin    Social History:   TOBACCO:   reports that she has quit smoking. Her smoking use included cigars and cigarettes. She started smoking about 3 years ago. She has a 0.20 pack-year smoking history. She has never used smokeless tobacco.  ETOH:   reports that she drinks alcohol. Family History:   No family history on file.    Or deferred/otherwise considered non contributory to current admission  PHYSICAL EXAM:    VS: /81   Pulse 95   Temp 97.7 °F (36.5 °C) (Oral)   Resp 16   Ht 5' 4\" (1.626 m)   Wt 264 lb 1 oz (119.8 kg) PROTIME, INR  U/A:   Lab Results   Component Value Date    LEUKOCYTESUR Negative 2019    PHUR 5.5 2019    WBCUA 0-1 2019    WBCUA 0-1 2012    RBCUA NONE 2019    RBCUA NONE 2013    BACTERIA NONE 2019    SPECGRAV 1.025 2019    BLOODU Negative 2019    GLUCOSEU Negative 2019    GLUCOSEU NEGATIVE 2012     ABG:  No results found for: PHART, OOB3XIJ, PO2ART, U3OKTJSR, YKM2YYN, BEART  TSH:  No results found for: TSH  Cardiac Enzymes:   Lab Results   Component Value Date    CKTOTAL 1,304 (H) 2014    CKMB 6.7 (H) 2014    TROPONINI <0.01 2017    TROPONINI <0.01 2014       Radiology: Us Abdomen Limited    Result Date: 2019  Patient MRN:  [id number] : 1985 Age: 35 years Gender: Female Order Date:  2019 11:15 PM TECHNIQUE/NUMBER OF IMAGES/COMPARISON/CLINICAL HISTORY: Right upper quadrant ultrasound. 43 images Grayscale/color Doppler real-time ultrasound. Abdominal pain. Reviewed images of CT abdomen 2018. FINDINGS: Liver has preserved size and normal echotexture. There is also seen on the CAT scan examination. No focal lesions are identified. Biliary tree is not dilated. CBD measures 5 mm there are. There is normal appearance for the gallbladder. The gallbladder measures 7.8 x 4 cm. Has normal wall thickness. There is no stones or sludge. There was no positive ultrasound Clemons's sign at the time the present study. There is no fluid accumulation in the region of the gallbladder fossa. In aeration of the periportal/peripancreatic spaces there is a lymph node measuring 2.6 x 0.8 x 2 cm. This was seen on the previous study of 2013 CT abdomen and pelvis. The pancreas was more difficult to be seen by ultrasound bilateral CAT scan examination has unremarkable appearance. Right kidney measures 11.1 x 4.4 cm and has normal cortical thickness, there is no hydronephrosis.  Most upper abdominal aorta and IVC appear unremarkable. There is no sizable right upper quadrant     Unremarkable ultrasound of the right upper quadrant. Assessment & Plan   ACTIVE hospital problems being addressed/reassessed for this admission:  Principal Problem:    Diffuse lymphadenopathy  Active Problems:    Dehydration    Nausea & vomiting  Resolved Problems:    * No resolved hospital problems. *    Code status/DVT prophylaxis and PLAN --see orders   Note extensive time spent coordinating care between ER docs, ER and floor nurses, and transitioning care over to day providers  Plan of care/ clinical impressions/communication specifics detailed below:    35 y.o. female  --morbid obesity, doesn't fu PCP regularly (?dr. Seda Jenkins vs other community clinic doc?-who recently told her they would look into mumps infection?)  Onset   2.5-3  weeks ago, noted lymphnodes TMJparotid areas b/l-then recurrent post neck. Then over next week, started noticing lymph nodes in post occiput and sides of neck-- at that time these were tender superficially --then later became sore on inside and silvano hurt to swallow over past week. Some component of chronic dry cough -- states that she vapes but is no longer a smoker. +after first week +cough/congestion then worsening--now improved   ongoing? chills  - feverish  +myalgia body aches. Less internal sore throat--+intermitten moderately severe nausea. Tolerating liquids but some foods now causing vomiting x1 severe yesterday, none since. 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 onging and now axillary and neck swollen lymph nodes, beginning proximally 2.5  week but worsening swollen tender  over the past few days. --more painful. Intermittent steroid shots from ER (x3 visits)-- ?helpful for more severe pain/myalgias.       CT:  RADIOLOGY:  Interpreted by Radiologist.  CTA PULMONARY W CONTRAST   Final Result       Bilateral axillary lymphadenopathy.       No evidence for pulmonary embolism or aortic dissection.               CT ABDOMEN PELVIS WO CONTRAST Additional Contrast? None   Final Result       No evidence for acute process on CT of abdomen and pelvis.       XR CHEST STANDARD (2 VW)   Final Result   No acute cardiopulmonary findings.         Inc WBC--neutrophils, mono, eosinophils-- subacute typical infectious etiology likely >  Mono spot negative,  Will order labs- hiv, ebv   Anti body rubella (can't order bartenalla  ? CMV)  Acute? hepatitis noted?  --new inc liver since last checked 3 years ago  CT didn't show liver change: \"Liver has preserved size and normal echotexture. There is also seen on the CAT scan examination. No focal lesions are identified. \"  -- noted chronic lymph node [id number] abd-- present for many years: \"In aeration of the periportal/peripancreatic spaces there is a lymph  node measuring 2.6 x 0.8 x 2 cm. This was seen on the previous study  of November 19, 2013 CT abdomen and pelvis. \"      ID consult-progressive, migratory lymphadenopathy --neck etc.  over past 2 weeks  ? gen surg for lymph node bx- based on ID opinion    Also ?ketorlac reportedly caused some rash/itchy from ER, asymptomatic when I evaluated patient      Mariela Fritz MD   Night Officer, overnight admitting doctor at [facility] call day time doctor   for questions after 7:30am    Covering for [address] [facility]  If Qs please call [phone]  Electronically signed by Mariya Singh MD on 4/5/2019 at 6:48 AM

## 2019-04-05 NOTE — CARE COORDINATION
Social Work / Discharge Planning :SW noted consult for ome needs. SW met with patient and explained role. Patient resides with her parents, sister, nephew and 10year old son. Patient independent prior to admit and is employed at [employer]. 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 Patent denies hx of HHC/SNF. Patent PCP is DR Yin Lugo and she uses [address] on [facility]. Patient plan at discharge is home. Patient does have an ID consult. Await ID plan. SW to follow.  Electronically signed by TRISTIN Salinas on 4/5/19 at 8:24 AM

## 2019-04-05 NOTE — PATIENT CARE CONFERENCE
[facility] Quality Flow/Interdisciplinary Rounds Progress Note        Quality Flow Rounds held on April 5, 2019    Disciplines Attending:  Bedside Nurse, ,  and Nursing Unit Leadership    Ashley Tejada was admitted on 4/4/2019  6:26 PM    Anticipated Discharge Date:  Expected Discharge Date: 04/08/19    Disposition:    Juan Score:  Juan Scale Score: 21    Readmission Risk              Risk of Unplanned Readmission:        11           Discussed patient goal for the day, patient clinical progression, and barriers to discharge.   The following Goal(s) of the Day/Commitment(s) have been identified:  IV Fluids - Obtain Order to Titrate or Discontinue      Jeanemarce Dempsey  April 5, 2019

## 2019-04-05 NOTE — ED NOTES
SBAR faxed to [facility] ; receipt confirmed with Lisa Park and fax confirmation.      Thien Galindo RN  04/05/19 [id number]

## 2019-04-05 NOTE — CONSULTS
Inpatient consult to Infectious Diseases  Consult performed by: Tati Garcia MD  Consult ordered by: Ashu Washington MD        [facility]  [facility]  Consultation Note     Admit Date: 4/4/2019  6:26 PM    Reason for Consult:   Progressive, migratory lymphadenopathy. Neck etc. over past 2 weeks    Attending Physician:  Pao Sheth DO    HISTORY OF PRESENT ILLNESS:             The history is obtained from extensive review of available past medical records. The patient is a 35 y.o. female who presented to the ED on 3/25/19 complaining of swollen neck lymph nodes. She had been using her products a couple of nights prior to that when she noticed some bumps on the sides of her neck. 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 She also stated that her son had a viral respiratory tract infection. She was treated with a dose of Dexamethasone and sent home. She came back to the ED on 3/29/19 with cough, body aches and swollen neck lymph nodes. She was treated with Azithromycin. The patient again came back to the ED on 4/4/19 complaining of nausea, vomiting, fevers, chills and swollen lymph nodes. She was admitted with a diagnosis of dehydration, fever, nausea vomiting and lymphadenopathy. The patient says that some of the lymph nodes, especially the ones around the neck and submandibular area have improved with the doses of steroids she received previously in the ED. Past Medical History:        Diagnosis Date    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 2/2013    Asthma     Hepatitis 4/5/2019    History of migraine     Infertility, female 10/7/2013     Past Surgical History:        Procedure Laterality Date    FRACTURE SURGERY  3/18/14    ORIF right tibial pylon fracture.     TIBIA FRACTURE SURGERY Right [id number]     Current Medications:   Scheduled Meds:   sodium chloride flush  10 mL Intravenous 2 times per day    enoxaparin  40 mg Subcutaneous Daily     Continuous Infusions:  PRN Meds:sodium chloride flush, acetaminophen, ondansetron, HYDROcodone 5 mg - acetaminophen, ketorolac, diphenhydrAMINE    Allergies:  Codeine; Penicillins;  Toradol [ketorolac tromethamine]; and Azithromycin    Social History:   Social History     Socioeconomic History    Marital status: Single     Spouse name: Not on file    Number of children: Not on file    Years of education: Not on file    Highest education level: Not on file   Occupational History    Not on file   Social Needs    Financial resource strain: Not on file    Food insecurity:     Worry: Not on file     Inability: Not on file    Transportation needs:     Medical: Not on file     Non-medical: Not on file   Tobacco Use    Smoking status: Former Smoker     Packs/day: 0.10     Years: 2.00     Pack years: 0.20     Types: Cigars, Cigarettes     Start date: 2016    Smokeless tobacco: Never Used    Tobacco comment: 1 pack of cigars a day - cigarettes only when she is stressed   Substance and Sexual Activity    Alcohol use: Yes     Comment: rarely    Drug use: Yes     Types: Marijuana     Comment: once in awhile    Sexual activity: Not Currently     Partners: Male   Lifestyle    Physical activity:     Days per week: Not on file     Minutes per session: Not on file    Stress: Not on file   Relationships    Social connections:     Talks on phone: Not on file     Gets together: Not on file     Attends Scientology service: Not on file     Active member of club or organization: Not on file     Attends meetings of clubs or organizations: Not on file     Relationship status: Not on file    Intimate partner violence:     Fear of current or ex partner: Not on file     Emotionally abused: Not on file     Physically abused: Not on file     Forced sexual activity: Not on file   Other Topics Concern    Not on file   Social History Narrative    Not on file SEDRATE  Lab Results   Component Value Date    ALT 91 (H) 04/05/2019    AST 42 (H) 04/05/2019    ALKPHOS 110 (H) 04/05/2019    BILITOT 0.3 04/05/2019     Lab Results   Component Value Date     04/04/2019    K 3.4 04/04/2019     04/04/2019    CO2 23 04/04/2019    BUN 7 04/04/2019    CREATININE 1.1 04/04/2019    GFRAA >60 04/04/2019    LABGLOM >60 04/04/2019    GLUCOSE 116 04/04/2019    GLUCOSE 81 01/06/2012    PROT 7.1 04/05/2019    LABALBU 3.4 04/05/2019    CALCIUM 8.2 04/04/2019    BILITOT 0.3 04/05/2019    ALKPHOS 110 04/05/2019    AST 42 04/05/2019    ALT 91 04/05/2019       No results found for: PROTIME, INR    No results found for: TSH    Lab Results   Component Value Date    COLORU Yellow 04/05/2019    PHUR 5.5 04/05/2019    WBCUA 0-1 04/05/2019    WBCUA 0-1 01/06/2012    RBCUA NONE 04/05/2019    RBCUA NONE 11/19/2013    BACTERIA NONE 04/05/2019    CLARITYU Clear 04/05/2019    SPECGRAV 1.025 04/05/2019    LEUKOCYTESUR Negative 04/05/2019    UROBILINOGEN 0.2 04/05/2019    BILIRUBINUR Negative 04/05/2019    BILIRUBINUR NEGATIVE 01/06/2012    BLOODU Negative 04/05/2019    GLUCOSEU Negative 04/05/2019    GLUCOSEU NEGATIVE 01/06/2012       Radiology:  Noted    Microbiology:  Pending  No results for input(s): BC in the last 72 hours. Recent Labs     04/04/19  2004   ORG FILM ARR ParaInfluenza 2 Virus Detected*     No results for input(s): Veatrice Banker in the last 72 hours. No results for input(s): STREPNEUMAGU in the last 72 hours. No results for input(s): LP1UAG in the last 72 hours. No results for input(s): ASO in the last 72 hours. No results for input(s): CULTRESP in the last 72 hours. No results for input(s): PROCAL in the last 72 hours. Assessment:  · Diffuse lymphadenopathy. The differential includes HIV infection, Beverly-Barr virus, lymphoma, toxoplasmosis, sarcoid, mycobacterial infections, cat scratch disease, wound cultures  · Parainfluenza 2 viral infection.  This seems to be unrelated to the above. It would be an unusual manifestation of this infection    Plan:    · Supportive treatment  · ACE level, Beverly-Barr antibodies, CMV antibodies, toxoplasma antibodies, AMENA, RPR, T spot  · Check cultures, baseline ESR, CRP  · Consider surgical consultation for lymph node biopsy  · Will follow with you    Thank you for having us see this patient in consultation. I will be discussing this case with the treating physicians.     Simone Briseno  2:35 PM  4/5/2019

## 2019-04-05 NOTE — PLAN OF CARE
Problem: Pain:  Goal: Control of acute pain  Description  Control of acute pain  Outcome: Met This Shift     Problem: Fluid Volume:  Goal: Signs and symptoms of dehydration will decrease  Description  Signs and symptoms of dehydration will decrease  Outcome: Met This Shift     Problem: Fluid Volume:  Goal: Maintenance of adequate hydration will improve  Description  Maintenance of adequate hydration will improve  Outcome: Met This Shift     Problem: Fluid Volume:  Goal: Ability to achieve a balanced intake and output will improve  Description  Ability to achieve a balanced intake and output will improve  Outcome: Met This Shift

## 2019-04-05 NOTE — PROGRESS NOTES
Seen today by Dr Charissa Savage,  Home health aid, Obesity  Blood and Urine cult-in progress  ID to follow

## 2019-04-06 PROCEDURE — 2580000003 HC RX 258: Performed by: INTERNAL MEDICINE

## 2019-04-06 PROCEDURE — 6360000002 HC RX W HCPCS: Performed by: INTERNAL MEDICINE

## 2019-04-06 PROCEDURE — 99225 PR SBSQ OBSERVATION CARE/DAY 25 MINUTES: CPT | Performed by: INTERNAL MEDICINE

## 2019-04-06 PROCEDURE — 6370000000 HC RX 637 (ALT 250 FOR IP): Performed by: INTERNAL MEDICINE

## 2019-04-06 PROCEDURE — 96376 TX/PRO/DX INJ SAME DRUG ADON: CPT

## 2019-04-06 PROCEDURE — G0378 HOSPITAL OBSERVATION PER HR: HCPCS

## 2019-04-06 PROCEDURE — 94640 AIRWAY INHALATION TREATMENT: CPT

## 2019-04-06 RX ORDER — IBUPROFEN 400 MG/1
400 TABLET ORAL EVERY 8 HOURS PRN
Status: DISCONTINUED | OUTPATIENT
Start: 2019-04-06 | End: 2019-04-06

## 2019-04-06 RX ORDER — IBUPROFEN 600 MG/1
600 TABLET ORAL EVERY 8 HOURS PRN
Status: DISCONTINUED | OUTPATIENT
Start: 2019-04-06 | End: 2019-04-10 | Stop reason: HOSPADM

## 2019-04-06 RX ORDER — NICOTINE 21 MG/24HR
1 PATCH, TRANSDERMAL 24 HOURS TRANSDERMAL DAILY
Status: DISCONTINUED | OUTPATIENT
Start: 2019-04-06 | End: 2019-04-10 | Stop reason: HOSPADM

## 2019-04-06 RX ADMIN — ALBUTEROL SULFATE 2 PUFF: 90 AEROSOL, METERED RESPIRATORY (INHALATION) at 13:10

## 2019-04-06 RX ADMIN — ALPRAZOLAM 0.25 MG: 0.25 TABLET ORAL at 08:09

## 2019-04-06 RX ADMIN — ALBUTEROL SULFATE 2 PUFF: 90 AEROSOL, METERED RESPIRATORY (INHALATION) at 02:55

## 2019-04-06 RX ADMIN — Medication 10 ML: at 20:47

## 2019-04-06 RX ADMIN — IBUPROFEN 400 MG: 400 TABLET, FILM COATED ORAL at 12:40

## 2019-04-06 RX ADMIN — DIPHENHYDRAMINE HYDROCHLORIDE 25 MG: 50 INJECTION, SOLUTION INTRAMUSCULAR; INTRAVENOUS at 18:25

## 2019-04-06 RX ADMIN — Medication 10 ML: at 08:09

## 2019-04-06 RX ADMIN — ACETAMINOPHEN 650 MG: 325 TABLET ORAL at 06:27

## 2019-04-06 RX ADMIN — ALPRAZOLAM 0.25 MG: 0.25 TABLET ORAL at 15:08

## 2019-04-06 ASSESSMENT — PAIN DESCRIPTION - PROGRESSION
CLINICAL_PROGRESSION: GRADUALLY WORSENING
CLINICAL_PROGRESSION: GRADUALLY WORSENING

## 2019-04-06 ASSESSMENT — PAIN SCALES - GENERAL
PAINLEVEL_OUTOF10: 3
PAINLEVEL_OUTOF10: 7
PAINLEVEL_OUTOF10: 0
PAINLEVEL_OUTOF10: 5

## 2019-04-06 ASSESSMENT — PAIN DESCRIPTION - LOCATION
LOCATION: BACK;NECK;ABDOMEN
LOCATION: HEAD

## 2019-04-06 ASSESSMENT — PAIN DESCRIPTION - ONSET
ONSET: GRADUAL
ONSET: GRADUAL

## 2019-04-06 ASSESSMENT — PAIN DESCRIPTION - ORIENTATION: ORIENTATION: RIGHT;LEFT;MID

## 2019-04-06 ASSESSMENT — PAIN - FUNCTIONAL ASSESSMENT
PAIN_FUNCTIONAL_ASSESSMENT: ACTIVITIES ARE NOT PREVENTED
PAIN_FUNCTIONAL_ASSESSMENT: ACTIVITIES ARE NOT PREVENTED

## 2019-04-06 ASSESSMENT — PAIN DESCRIPTION - PAIN TYPE
TYPE: ACUTE PAIN
TYPE: ACUTE PAIN

## 2019-04-06 ASSESSMENT — PAIN DESCRIPTION - DESCRIPTORS
DESCRIPTORS: HEADACHE
DESCRIPTORS: ACHING;DISCOMFORT;SORE

## 2019-04-06 ASSESSMENT — PAIN DESCRIPTION - FREQUENCY
FREQUENCY: INTERMITTENT
FREQUENCY: INTERMITTENT

## 2019-04-06 NOTE — PLAN OF CARE
Problem: Pain:  Description  Pain management should include both nonpharmacologic and pharmacologic interventions. Goal: Pain level will decrease  Description  Pain level will decrease  4/5/2019 2256 by Olivia Carter RN  Outcome: Met This Shift     Problem: Fluid Volume:  Goal: Maintenance of adequate hydration will improve  Description  Maintenance of adequate hydration will improve  4/5/2019 2256 by Olivia Carter RN  Outcome: Met This Shift     Problem:  Activity:  Goal: Energy level will increase  Description  Energy level will increase  Outcome: Met This Shift     Problem: Anxiety:  Goal: Level of anxiety will decrease  Description  Level of anxiety will decrease  Outcome: Met This Shift

## 2019-04-06 NOTE — PROGRESS NOTES
[address] [place] [address] [facility]  Progress Note    SUBJECTIVE:  Chief Complaint   Patient presents with    Nausea    Dizziness    Fever    Other     states lymph nodes swollen all over body     C/o systemic pruritis, few raised areas of rash abd, mostly left dorsal hand.   + nausea. + NPC, no sob. C/o h/a and photosensitivity. Painful enlarged neck lymph nodes. Generalized discomfort    Review of systems:  As stated above in the chief complaint, otherwise negative. Medications:  Scheduled Meds:   sodium chloride flush  10 mL Intravenous 2 times per day    enoxaparin  40 mg Subcutaneous Daily     Continuous Infusions:  PRN Meds:sodium chloride flush, acetaminophen, ondansetron, HYDROcodone 5 mg - acetaminophen, ketorolac, diphenhydrAMINE, ALPRAZolam, albuterol sulfate HFA    OBJECTIVE:  /72 Comment: manual   Pulse 92   Temp 98.2 °F (36.8 °C) (Oral)   Resp 18   Ht 5' 4\" (1.626 m)   Wt 268 lb 6.4 oz (121.7 kg)   LMP 2019 (Approximate)   SpO2 97%   BMI 46.07 kg/m²   Temp  Av °F (36.7 °C)  Min: 97.8 °F (36.6 °C)  Max: 98.2 °F (36.8 °C)  Constitutional: The patient is awake, alert, and oriented. Skin: Warm and dry. No rashes were noted. HEENT: Eyes show round, and reactive pupils. No jaundice. Moist mucous membranes, no ulcerations, no thrush. Neck: Supple to movements. Enlarged tender bilateral submandibular nodes, enlarged tender left occipital node. Chest: No use of accessory muscles to breathe. Symmetrical expansion. Auscultation reveals no wheezing, crackles, or rhonchi. Cardiovascular: S1 and S2 are rhythmic and regular. No murmurs appreciated. Abdomen: Positive bowel sounds to auscultation. Benign to palpation. obese  Extremities: No clubbing, no cyanosis, no edema.   Lines: peripheral    Laboratory and Tests Review:  Lab Results   Component Value Date    WBC 12.2 (H) 2019    WBC 16.3 (H) 2019    WBC 6.8 2019    HGB 12.8 2019 HCT 37.7 04/04/2019    MCV 84.0 04/04/2019     04/04/2019     Lab Results   Component Value Date    NEUTROABS 9.79 (H) 04/04/2019    NEUTROABS 4.24 04/04/2019    NEUTROABS 5.56 03/29/2019     No results found for: CRP  No results found for: CRPHS  No results found for: SEDRATE  Lab Results   Component Value Date    ALT 91 (H) 04/05/2019    AST 42 (H) 04/05/2019    ALKPHOS 110 (H) 04/05/2019    BILITOT 0.3 04/05/2019     Lab Results   Component Value Date     04/04/2019    K 3.4 04/04/2019     04/04/2019    CO2 23 04/04/2019    BUN 7 04/04/2019    CREATININE 1.1 04/04/2019    CREATININE 0.9 03/29/2019    CREATININE 0.7 07/27/2017    GFRAA >60 04/04/2019    LABGLOM >60 04/04/2019    GLUCOSE 116 04/04/2019    GLUCOSE 81 01/06/2012    PROT 7.1 04/05/2019    LABALBU 3.4 04/05/2019    CALCIUM 8.2 04/04/2019    BILITOT 0.3 04/05/2019    ALKPHOS 110 04/05/2019    AST 42 04/05/2019    ALT 91 04/05/2019     Radiology:      Microbiology:   resp viral panel + parainfluenza 2  Blood cx neg  Influenza neg      ASSESSMENT:  · Diffuse lymphadenopathy. The differential includes HIV infection, Beverly-Barr virus, lymphoma, toxoplasmosis, sarcoid, mycobacterial infections, cat scratch diseaseb  · Parainfluenza 2 viral infection. This seems to be unrelated to the above. It would be an unusual manifestation of this infection     Plan:    · Supportive treatment  · F/u ACE level, Beverly-Barr antibodies, CMV antibodies, toxoplasma antibodies, AMENA, RPR, T spot  · Consider surgical consultation for lymph node biopsy  · Will follow with you    Ale Childress  11:18 AM  4/6/2019     Patient seen and examined. I had a face to face encounter with the patient. Agree with exam, assessment and plan as outlined above. Addition and corrections were done as deemed appropriate. My exam and plan include: The patient is now complaining of itching. She still has lymphadenopathy.  Although she pointed out to some areas where she says she has a rash, I really did not see any rash. She says that the itching is not relieved by Benadryl. Awaiting for surgery to evaluate for lymph node biopsy.     Dao Sahu  4/6/2019

## 2019-04-06 NOTE — PROGRESS NOTES
[facility] C[facility] AT [facility] Hospitalist   Progress Note    Admitting Date and Time: 4/4/2019  6:26 PM  Admit Dx: Dehydration [E86.0]  Dehydration [E86.0]    Subjective: Admitted yesterday morning with cervical lymphadenopathy. Patient has been seen by ID, work up in progress. Patient was admitted with Dehydration [E86.0]  Dehydration [E86.0]. Patient feels with headache, started following pain and when pain control wears off  Per RN: No new issues    ROS: denies fever, chills, cp, sob, n/v, HA unless stated above.      sodium chloride flush  10 mL Intravenous 2 times per day    enoxaparin  40 mg Subcutaneous Daily       ibuprofen 400 mg Q8H PRN   sodium chloride flush 10 mL PRN   acetaminophen 650 mg Q4H PRN   ondansetron 4 mg Q6H PRN   HYDROcodone 5 mg - acetaminophen 1 tablet Q6H PRN   ketorolac 15 mg Q6H PRN   diphenhydrAMINE 25 mg Q6H PRN   ALPRAZolam 0.25 mg 4x Daily PRN   albuterol sulfate HFA 2 puff Q4H PRN        Objective:    /72 Comment: manual   Pulse 92   Temp 98.2 °F (36.8 °C) (Oral)   Resp 18   Ht 5' 4\" (1.626 m)   Wt 268 lb 6.4 oz (121.7 kg)   LMP 03/12/2019 (Approximate)   SpO2 97%   BMI 46.07 kg/m²   General Appearance: alert and oriented to person, place and time, well-developed and well-nourished, in no acute distress  Skin: warm and dry, no rash or erythema  Head: normocephalic and atraumatic  Eyes: pupils equal, round, and reactive to light, extraocular eye movements intact, conjunctivae normal  ENT: tympanic membrane, external ear and ear canal normal bilaterally, oropharynx clear and moist with normal mucous membranes  Neck: neck supple and non tender without mass, no thyromegaly or thyroid nodules, no cervical lymphadenopathy   Pulmonary/Chest: clear to auscultation bilaterally- no wheezes, rales or rhonchi, normal air movement, no respiratory distress  Cardiovascular: normal rate, normal S1 and S2, no gallops, intact distal pulses and no carotid bruits  Abdomen: soft, non-tender, non-distended, normal bowel sounds, no masses or organomegaly    Local Exam  Lymph nodes firm, not conglomerated, No erythema. Recent Labs     04/04/19 2003      K 3.4*      CO2 23   BUN 7   CREATININE 1.1*   GLUCOSE 116*   CALCIUM 8.2*       Recent Labs     04/04/19  1100 04/04/19  1908   WBC 16.3* 12.2*   RBC 4.92 4.49   HGB 12.7 12.8   HCT 40.0 37.7   MCV 81.3 84.0   MCH 25.8* 28.5   MCHC 31.8* 34.0   RDW 15.2* 17.4*    206   MPV 11.3 11.8           Radiology:   US ABDOMEN LIMITED   Final Result   Unremarkable ultrasound of the right upper quadrant. Assessment:    Principal Problem:    Diffuse lymphadenopathy  Active Problems:    Dehydration    Nausea & vomiting    Hepatitis    Adverse drug reaction  Resolved Problems:    * No resolved hospital problems. *      Plan:  1. Increase dose of advil  2.  Surgery consult for bx          Electronically signed by Kaylen Lundy MD on 4/6/2019 at 1:37 PM

## 2019-04-07 LAB — URINE CULTURE, ROUTINE: NORMAL

## 2019-04-07 PROCEDURE — APPSS30 APP SPLIT SHARED TIME 16-30 MINUTES: Performed by: NURSE PRACTITIONER

## 2019-04-07 PROCEDURE — 99225 PR SBSQ OBSERVATION CARE/DAY 25 MINUTES: CPT | Performed by: INTERNAL MEDICINE

## 2019-04-07 PROCEDURE — 6360000002 HC RX W HCPCS: Performed by: INTERNAL MEDICINE

## 2019-04-07 PROCEDURE — 96376 TX/PRO/DX INJ SAME DRUG ADON: CPT

## 2019-04-07 PROCEDURE — G0378 HOSPITAL OBSERVATION PER HR: HCPCS

## 2019-04-07 PROCEDURE — 2580000003 HC RX 258: Performed by: INTERNAL MEDICINE

## 2019-04-07 PROCEDURE — 6370000000 HC RX 637 (ALT 250 FOR IP): Performed by: INTERNAL MEDICINE

## 2019-04-07 RX ADMIN — DIPHENHYDRAMINE HYDROCHLORIDE 25 MG: 50 INJECTION, SOLUTION INTRAMUSCULAR; INTRAVENOUS at 21:39

## 2019-04-07 RX ADMIN — Medication 10 ML: at 21:40

## 2019-04-07 RX ADMIN — ALPRAZOLAM 0.25 MG: 0.25 TABLET ORAL at 09:14

## 2019-04-07 RX ADMIN — IBUPROFEN 600 MG: 600 TABLET, FILM COATED ORAL at 02:49

## 2019-04-07 RX ADMIN — Medication 10 ML: at 09:14

## 2019-04-07 RX ADMIN — DIPHENHYDRAMINE HYDROCHLORIDE 25 MG: 50 INJECTION, SOLUTION INTRAMUSCULAR; INTRAVENOUS at 09:14

## 2019-04-07 RX ADMIN — IBUPROFEN 600 MG: 600 TABLET, FILM COATED ORAL at 21:40

## 2019-04-07 RX ADMIN — HYDROCODONE BITARTRATE AND ACETAMINOPHEN 1 TABLET: 5; 325 TABLET ORAL at 15:00

## 2019-04-07 RX ADMIN — DIPHENHYDRAMINE HYDROCHLORIDE 25 MG: 50 INJECTION, SOLUTION INTRAMUSCULAR; INTRAVENOUS at 00:36

## 2019-04-07 ASSESSMENT — PAIN DESCRIPTION - PROGRESSION
CLINICAL_PROGRESSION: NOT CHANGED
CLINICAL_PROGRESSION: NOT CHANGED

## 2019-04-07 ASSESSMENT — PAIN DESCRIPTION - ONSET
ONSET: ON-GOING
ONSET: ON-GOING

## 2019-04-07 ASSESSMENT — PAIN SCALES - WONG BAKER: WONGBAKER_NUMERICALRESPONSE: 0

## 2019-04-07 ASSESSMENT — PAIN DESCRIPTION - PAIN TYPE
TYPE: ACUTE PAIN
TYPE: ACUTE PAIN

## 2019-04-07 ASSESSMENT — PAIN DESCRIPTION - FREQUENCY
FREQUENCY: INTERMITTENT
FREQUENCY: INTERMITTENT

## 2019-04-07 ASSESSMENT — PAIN SCALES - GENERAL
PAINLEVEL_OUTOF10: 0
PAINLEVEL_OUTOF10: 3
PAINLEVEL_OUTOF10: 0
PAINLEVEL_OUTOF10: 7
PAINLEVEL_OUTOF10: 3

## 2019-04-07 ASSESSMENT — PAIN DESCRIPTION - DESCRIPTORS
DESCRIPTORS: ACHING;DISCOMFORT;SORE
DESCRIPTORS: ACHING;DISCOMFORT;SORE

## 2019-04-07 ASSESSMENT — PAIN DESCRIPTION - LOCATION
LOCATION: NECK
LOCATION: NECK

## 2019-04-07 NOTE — PROGRESS NOTES
[facility]   Progress Note    Admitting Date and Time: 4/4/2019  6:26 PM  Admit Dx: Dehydration [E86.0]  Dehydration [E86.0]    Subjective:    Patient was admitted with Dehydration [E86.0]  Dehydration [E86.0]. Patient complaining of bilateral hand and neck pain. Patient denies any fever or chills at this time       ROS: denies fever, chills, cp, sob, n/v, HA unless stated above.      nicotine  1 patch Transdermal Daily    sodium chloride flush  10 mL Intravenous 2 times per day    enoxaparin  40 mg Subcutaneous Daily       ibuprofen 600 mg Q8H PRN   sodium chloride flush 10 mL PRN   acetaminophen 650 mg Q4H PRN   ondansetron 4 mg Q6H PRN   HYDROcodone 5 mg - acetaminophen 1 tablet Q6H PRN   diphenhydrAMINE 25 mg Q6H PRN   ALPRAZolam 0.25 mg 4x Daily PRN   albuterol sulfate HFA 2 puff Q4H PRN        Objective:    /76   Pulse 110   Temp 98 °F (36.7 °C) (Oral)   Resp 18   Ht 5' 4\" (1.626 m)   Wt 268 lb 9.6 oz (121.8 kg)   LMP 03/12/2019 (Approximate)   SpO2 97%   BMI 46.11 kg/m²   General Appearance: alert and oriented to person, place and time, well-developed and well-nourished, in no acute distress, obese  Skin: warm and dry, no rash or erythema  Head: normocephalic and atraumatic  Eyes: pupils equal, round, and reactive to light and extraocular eye movements intact  ENT: hearing grossly normal bilaterally  Neck: tenderness present- neck and tender cervical adenopathy present   Pulmonary/Chest: clear to auscultation bilaterally- no wheezes, rales or rhonchi, normal air movement, no respiratory distress  Cardiovascular: normal rate, regular rhythm, intact distal pulses and no JVD  Abdomen: soft, non-tender, non-distended, normal bowel sounds, no masses or organomegaly  Extremities: no cyanosis, no clubbing and no edema  Musculoskeletal: normal range of motion, no joint swelling, deformity or tenderness  Neurologic: no cranial nerve deficit, gait and coordination normal and speech normal      Recent Labs     04/04/19 2003      K 3.4*      CO2 23   BUN 7   CREATININE 1.1*   GLUCOSE 116*   CALCIUM 8.2*       Recent Labs     04/04/19  1908   WBC 12.2*   RBC 4.49   HGB 12.8   HCT 37.7   MCV 84.0   MCH 28.5   MCHC 34.0   RDW 17.4*      MPV 11.8            Radiology:   US ABDOMEN LIMITED   Final Result   Unremarkable ultrasound of the right upper quadrant. Assessment:    Principal Problem:    Diffuse lymphadenopathy  Active Problems:    Dehydration    Nausea & vomiting    Hepatitis    Adverse drug reaction  Resolved Problems:    * No resolved hospital problems. *      Plan:  1. Diffuse Lymphadenopathy - c/o neck and bilateral hands pain - general surgery and ID following - biopsy planned for 4/9/19 -   2. Dehydration - improved   3. Parainfluenza 2 - contact and droplet precaution maintained - supportive therapy - will monitor   4. Hepatitis - universal precaution maintained   5. Adverse Drug Reaction - no signs of reaction noted - no shortness of breath or tongue swelling   6.  Nausea and Vomiting - Zofran as needed         Electronically signed by ARNOLD Jay CNP on 4/7/2019 at 2:29 PM

## 2019-04-07 NOTE — PROGRESS NOTES
[address] [place] [address] [facility]  Progress Note    SUBJECTIVE:  Chief Complaint   Patient presents with    Nausea    Dizziness    Fever    Other     states lymph nodes swollen all over body     C/o systemic pruritis, no rash other than small area dorsum left hand   + NPC, no sob. C/o h/a throughout the night - improved currently. Painful enlarged neck lymph nodes. Review of systems:  As stated above in the chief complaint, otherwise negative. Medications:  Scheduled Meds:   nicotine  1 patch Transdermal Daily    sodium chloride flush  10 mL Intravenous 2 times per day    enoxaparin  40 mg Subcutaneous Daily     Continuous Infusions:  PRN Meds:ibuprofen, sodium chloride flush, acetaminophen, ondansetron, HYDROcodone 5 mg - acetaminophen, diphenhydrAMINE, ALPRAZolam, albuterol sulfate HFA    OBJECTIVE:  /76   Pulse 110   Temp 98 °F (36.7 °C) (Oral)   Resp 18   Ht 5' 4\" (1.626 m)   Wt 268 lb 9.6 oz (121.8 kg)   LMP 2019 (Approximate)   SpO2 97%   BMI 46.11 kg/m²   Temp  Av.9 °F (36.6 °C)  Min: 97.7 °F (36.5 °C)  Max: 98 °F (36.7 °C)  Constitutional: The patient is awake, alert, and oriented. Skin: Warm and dry. No rashes were noted. HEENT: Eyes show round, and reactive pupils. No jaundice. Moist mucous membranes, no ulcerations, no thrush. Neck: Supple to movements. Enlarged tender bilateral submandibular nodes, enlarged tender left occipital node. Chest: No use of accessory muscles to breathe. Symmetrical expansion. Auscultation reveals no wheezing, crackles, or rhonchi. Cardiovascular: S1 and S2 are rhythmic and regular. No murmurs appreciated. Abdomen: Positive bowel sounds to auscultation. Benign to palpation. Obese. Tender LUQ  Extremities: No clubbing, no cyanosis, no edema.   Lines: peripheral    Laboratory and Tests Review:  Lab Results   Component Value Date    WBC 12.2 (H) 2019    WBC 16.3 (H) 2019    WBC 6.8 2019    HGB 12.8 04/04/2019    HCT 37.7 04/04/2019    MCV 84.0 04/04/2019     04/04/2019     Lab Results   Component Value Date    NEUTROABS 9.79 (H) 04/04/2019    NEUTROABS 4.24 04/04/2019    NEUTROABS 5.56 03/29/2019     No results found for: CRP  No results found for: CRPHS  No results found for: SEDRATE  Lab Results   Component Value Date    ALT 91 (H) 04/05/2019    AST 42 (H) 04/05/2019    ALKPHOS 110 (H) 04/05/2019    BILITOT 0.3 04/05/2019     Lab Results   Component Value Date     04/04/2019    K 3.4 04/04/2019     04/04/2019    CO2 23 04/04/2019    BUN 7 04/04/2019    CREATININE 1.1 04/04/2019    CREATININE 0.9 03/29/2019    CREATININE 0.7 07/27/2017    GFRAA >60 04/04/2019    LABGLOM >60 04/04/2019    GLUCOSE 116 04/04/2019    GLUCOSE 81 01/06/2012    PROT 7.1 04/05/2019    LABALBU 3.4 04/05/2019    CALCIUM 8.2 04/04/2019    BILITOT 0.3 04/05/2019    ALKPHOS 110 04/05/2019    AST 42 04/05/2019    ALT 91 04/05/2019     Radiology:      Microbiology:   resp viral panel + parainfluenza 2  Blood cx neg  Influenza neg  3/29 neg mono      ASSESSMENT:  · Diffuse lymphadenopathy. The differential includes HIV infection, Beverly-Barr virus, lymphoma, toxoplasmosis, sarcoid, mycobacterial infections, cat scratch disease  · Parainfluenza 2 viral infection. This seems to be unrelated to the above. It would be an unusual manifestation of this infection     Plan:    · Supportive treatment  · F/u ACE level, Beverly-Barr antibodies, CMV antibodies, toxoplasma antibodies, AMENA, RPR, T spot  · Surgery has been consulted for bx - d/w Dr. Luis Navas. · CBC in am  · Will follow with you    April [facility]  10:54 AM  4/7/2019     Patient seen and examined. I had a face to face encounter with the patient. Agree with exam, assessment and plan as outlined above. Addition and corrections were done as deemed appropriate. My exam and plan include: Patient feels no different. Left suboccipital lymph no biopsy planned for Tuesday. Still awaiting send out results.     Artis Solano  4/7/2019

## 2019-04-07 NOTE — CONSULTS
Surgery Consult    Patient's Name/Date of Birth: Zulma Mills / 1985, 35 y.o. yo    Date: April 7, 2019     PCP: Miah Corona MD     Reason for Consult: adenopathy      History of Present Illness: 35year old female who presented 4/5 with fever and chills, swollen lymph nodes. Recent URI. ID workup in progress, with multiple send-out labs pending. 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  TIBIA FRACTURE SURGERY Right [id number]      No family history on file. Allergies: Codeine; Penicillins;  Toradol [ketorolac tromethamine]; and Azithromycin     Current Facility-Administered Medications   Medication Dose Route Frequency Provider Last Rate Last Dose    ibuprofen (ADVIL;MOTRIN) tablet 600 mg  600 mg Oral Q8H PRN Chelsea Truong MD   600 mg at 04/07/19 0249    nicotine (NICODERM CQ) 14 MG/24HR 1 patch  1 patch Transdermal Daily Ruchi Sanders MD   1 patch at 04/07/19 0914    sodium chloride flush 0.9 % injection 10 mL  10 mL Intravenous 2 times per day Ananth Hric, DO   10 mL at 04/07/19 0914    sodium chloride flush 0.9 % injection 10 mL  10 mL Intravenous PRN Ananth Hric, DO   10 mL at 04/06/19 2047    acetaminophen (TYLENOL) tablet 650 mg  650 mg Oral Q4H PRN Ananth Hric, DO   650 mg at 04/06/19 0627    enoxaparin (LOVENOX) injection 40 mg  40 mg Subcutaneous Daily Ananth Hric, DO        ondansetron (ZOFRAN) injection 4 mg  4 mg Intravenous Q6H PRN Emilee Kendrick MD        HYDROcodone-acetaminophen [facility]) 5-325 MG per tablet 1 tablet  1 tablet Oral Q6H PRN Emilee Kendrick MD   1 tablet at 04/05/19 2058    diphenhydrAMINE (BENADRYL) injection 25 mg  25 mg Intravenous Q6H PRN Linus Newton MD   25 mg at 19 0914    ALPRAZolam [facility]) tablet 0.25 mg  0.25 mg Oral 4x Daily PRN Tiffany Melchor MD   0.25 mg at 19 0914    albuterol sulfate  (90 Base) MCG/ACT inhaler 2 puff  2 puff Inhalation Q4H PRN Terrell Kaur MD   2 puff at 19 1310       Social History     Tobacco Use    Smoking status: Former Smoker     Packs/day: 0.10     Years: 2.00     Pack years: 0.20     Types: Cigars, Cigarettes     Start date:     Smokeless tobacco: Never Used    Tobacco comment: 1 pack of cigars a day - cigarettes only when she is stressed   Substance Use Topics    Alcohol use: Yes     Comment: rarely        Review of Systems:    Other than stated above in the HPI is negative      Physical exam:     Patient Vitals for the past 24 hrs:   BP Temp Temp src Pulse Resp SpO2 Weight   19 0911 132/76 98 °F (36.7 °C) Oral 110 18 97 % --   19 0059 -- -- -- -- -- -- 268 lb 9.6 oz (121.8 kg)   19 2041 (!) 148/88 97.7 °F (36.5 °C) Oral 91 20 95 % --       General appearance: no acute distress  Head: NCAT, PERRLA, EOMI  Neck: bilateral bulky tender lymph nodes along upper anterior and posterior chains; large left occipital node  Lungs: CTABL  Heart: RRR  Abdomen: soft, obese, nondistended, nontender, no guarding or peritoneal signs. Extremities: no rash cyanosis edema or jaundice    Labs:    Recent Labs     19  1908   WBC 12.2*   HGB 12.8   HCT 37.7        Recent Labs     19   CREATININE 1.1*   BUN 7      K 3.4*      CO2 23     Recent Labs     19  1120   AST 71* 42*   * 91*   BILITOT 0.4 0.3   ALKPHOS 132* 110*     Recent Labs     19   LIPASE 7*     No results for input(s): LACTATE in the last 72 hours. No results for input(s): INR, PTT in the last 72 hours.     Invalid input(s): PT    Films:  Us Abdomen Limited    Result Date: 2019  Patient MRN:  [id number] :

## 2019-04-08 ENCOUNTER — ANESTHESIA EVENT (OUTPATIENT)
Dept: OPERATING ROOM | Age: 34
End: 2019-04-08
Payer: MEDICAID

## 2019-04-08 LAB
ANION GAP SERPL CALCULATED.3IONS-SCNC: 9 MMOL/L (ref 7–16)
ATYPICAL LYMPHOCYTE RELATIVE PERCENT: 4.5 % (ref 0–4)
BASOPHILS ABSOLUTE: 0.14 E9/L (ref 0–0.2)
BASOPHILS RELATIVE PERCENT: 0.9 % (ref 0–2)
BUN BLDV-MCNC: 8 MG/DL (ref 6–20)
CALCIUM SERPL-MCNC: 9.1 MG/DL (ref 8.6–10.2)
CHLORIDE BLD-SCNC: 100 MMOL/L (ref 98–107)
CO2: 28 MMOL/L (ref 22–29)
COMMENT: NORMAL
CREAT SERPL-MCNC: 0.9 MG/DL (ref 0.5–1)
EOSINOPHILS ABSOLUTE: 3.45 E9/L (ref 0.05–0.5)
EOSINOPHILS RELATIVE PERCENT: 22.7 % (ref 0–6)
GFR AFRICAN AMERICAN: >60
GFR NON-AFRICAN AMERICAN: >60 ML/MIN/1.73
GLUCOSE BLD-MCNC: 103 MG/DL (ref 74–99)
HAV IGM SER IA-ACNC: NORMAL
HCT VFR BLD CALC: 38.2 % (ref 34–48)
HEMOGLOBIN: 12.6 G/DL (ref 11.5–15.5)
HEPATITIS B CORE IGM ANTIBODY: NORMAL
HEPATITIS B SURFACE ANTIGEN INTERPRETATION: NORMAL
HEPATITIS C ANTIBODY INTERPRETATION: NORMAL
HIV-1 AND HIV-2 ANTIBODIES: NORMAL
LYMPHOCYTES ABSOLUTE: 4.86 E9/L (ref 1.5–4)
LYMPHOCYTES RELATIVE PERCENT: 27.3 % (ref 20–42)
MCH RBC QN AUTO: 26.8 PG (ref 26–35)
MCHC RBC AUTO-ENTMCNC: 33 % (ref 32–34.5)
MCV RBC AUTO: 81.1 FL (ref 80–99.9)
MONOCYTES ABSOLUTE: 0.91 E9/L (ref 0.1–0.95)
MONOCYTES RELATIVE PERCENT: 5.5 % (ref 2–12)
NEUTROPHILS ABSOLUTE: 5.93 E9/L (ref 1.8–7.3)
NEUTROPHILS RELATIVE PERCENT: 39.1 % (ref 43–80)
NUCLEATED RED BLOOD CELLS: 0 /100 WBC
PDW BLD-RTO: 15.5 FL (ref 11.5–15)
PLATELET # BLD: 265 E9/L (ref 130–450)
PMV BLD AUTO: 10.3 FL (ref 7–12)
POTASSIUM SERPL-SCNC: 4.5 MMOL/L (ref 3.5–5)
RBC # BLD: 4.71 E12/L (ref 3.5–5.5)
RBC # BLD: NORMAL 10*6/UL
REPORT: NORMAL
RPR: NORMAL
SODIUM BLD-SCNC: 137 MMOL/L (ref 132–146)
WBC # BLD: 15.2 E9/L (ref 4.5–11.5)

## 2019-04-08 PROCEDURE — 96375 TX/PRO/DX INJ NEW DRUG ADDON: CPT

## 2019-04-08 PROCEDURE — 99233 SBSQ HOSP IP/OBS HIGH 50: CPT | Performed by: INTERNAL MEDICINE

## 2019-04-08 PROCEDURE — 80048 BASIC METABOLIC PNL TOTAL CA: CPT

## 2019-04-08 PROCEDURE — 6370000000 HC RX 637 (ALT 250 FOR IP): Performed by: INTERNAL MEDICINE

## 2019-04-08 PROCEDURE — 85025 COMPLETE CBC W/AUTO DIFF WBC: CPT

## 2019-04-08 PROCEDURE — 2580000003 HC RX 258: Performed by: INTERNAL MEDICINE

## 2019-04-08 PROCEDURE — 36415 COLL VENOUS BLD VENIPUNCTURE: CPT

## 2019-04-08 PROCEDURE — 6360000002 HC RX W HCPCS: Performed by: INTERNAL MEDICINE

## 2019-04-08 PROCEDURE — 96372 THER/PROPH/DIAG INJ SC/IM: CPT

## 2019-04-08 PROCEDURE — G0378 HOSPITAL OBSERVATION PER HR: HCPCS

## 2019-04-08 RX ORDER — SUMATRIPTAN 6 MG/.5ML
6 INJECTION, SOLUTION SUBCUTANEOUS ONCE
Status: COMPLETED | OUTPATIENT
Start: 2019-04-08 | End: 2019-04-08

## 2019-04-08 RX ORDER — METOCLOPRAMIDE HYDROCHLORIDE 5 MG/ML
10 INJECTION INTRAMUSCULAR; INTRAVENOUS ONCE
Status: COMPLETED | OUTPATIENT
Start: 2019-04-08 | End: 2019-04-08

## 2019-04-08 RX ADMIN — SUMATRIPTAN SUCCINATE 6 MG: 6 INJECTION SUBCUTANEOUS at 22:15

## 2019-04-08 RX ADMIN — IBUPROFEN 600 MG: 600 TABLET, FILM COATED ORAL at 07:53

## 2019-04-08 RX ADMIN — METOCLOPRAMIDE 10 MG: 5 INJECTION, SOLUTION INTRAMUSCULAR; INTRAVENOUS at 22:15

## 2019-04-08 RX ADMIN — Medication 10 ML: at 07:53

## 2019-04-08 RX ADMIN — IBUPROFEN 600 MG: 600 TABLET, FILM COATED ORAL at 17:47

## 2019-04-08 RX ADMIN — Medication 10 ML: at 22:16

## 2019-04-08 ASSESSMENT — PAIN DESCRIPTION - PROGRESSION
CLINICAL_PROGRESSION: NOT CHANGED
CLINICAL_PROGRESSION: GRADUALLY WORSENING

## 2019-04-08 ASSESSMENT — PAIN DESCRIPTION - LOCATION
LOCATION: HEAD
LOCATION: HEAD

## 2019-04-08 ASSESSMENT — PAIN DESCRIPTION - FREQUENCY
FREQUENCY: INTERMITTENT
FREQUENCY: INTERMITTENT

## 2019-04-08 ASSESSMENT — PAIN DESCRIPTION - ORIENTATION: ORIENTATION: RIGHT;LEFT;MID

## 2019-04-08 ASSESSMENT — PAIN SCALES - WONG BAKER
WONGBAKER_NUMERICALRESPONSE: 0
WONGBAKER_NUMERICALRESPONSE: 0

## 2019-04-08 ASSESSMENT — PAIN SCALES - GENERAL
PAINLEVEL_OUTOF10: 3
PAINLEVEL_OUTOF10: 3
PAINLEVEL_OUTOF10: 7

## 2019-04-08 ASSESSMENT — PAIN DESCRIPTION - DESCRIPTORS
DESCRIPTORS: HEADACHE
DESCRIPTORS: HEADACHE

## 2019-04-08 ASSESSMENT — PAIN DESCRIPTION - PAIN TYPE
TYPE: ACUTE PAIN
TYPE: ACUTE PAIN

## 2019-04-08 ASSESSMENT — PAIN DESCRIPTION - ONSET
ONSET: ON-GOING
ONSET: ON-GOING

## 2019-04-08 NOTE — PROGRESS NOTES
[facility] Progress Note    Admitting Date and Time: 4/4/2019  6:26 PM  Admit Dx: Dehydration [E86.0]  Dehydration [E86.0]    Subjective:  Patient is being followed for Dehydration [E86.0]  Dehydration [E86.0]   Pt feels okay, she was anxious about the lymph node biopsy tomorrow    ROS: denies fever, chills, cp, sob, n/v, HA unless stated above.       nicotine  1 patch Transdermal Daily    sodium chloride flush  10 mL Intravenous 2 times per day    enoxaparin  40 mg Subcutaneous Daily       ibuprofen 600 mg Q8H PRN   sodium chloride flush 10 mL PRN   acetaminophen 650 mg Q4H PRN   ondansetron 4 mg Q6H PRN   HYDROcodone 5 mg - acetaminophen 1 tablet Q6H PRN   diphenhydrAMINE 25 mg Q6H PRN   ALPRAZolam 0.25 mg 4x Daily PRN   albuterol sulfate HFA 2 puff Q4H PRN        Objective:    /62   Pulse 102   Temp 98.3 °F (36.8 °C) (Oral)   Resp 18   Ht 5' 4\" (1.626 m)   Wt 256 lb (116.1 kg)   LMP 03/12/2019 (Approximate)   SpO2 98%   BMI 43.94 kg/m²   General Appearance: alert and oriented to person, place and time , looks anxious  Skin: warm and dry  Head: normocephalic and atraumatic  Eyes: pupils equal, round, and reactive to light, extraocular eye movements intact, conjunctivae normal  Neck: neck supple and with tender lymphadenopathy posterior neck, left neck and left submandibular  Pulmonary/Chest: clear to auscultation bilaterally- no wheezes, rales or rhonchi, normal air movement, no respiratory distress  Cardiovascular: normal rate, normal S1 and S2 and no carotid bruits  Abdomen: soft, non-tender, non-distended, normal bowel sounds, no masses or organomegaly  Extremities: no cyanosis, no clubbing and no edema  Neurologic: no cranial nerve deficit and speech normal        Recent Labs     04/08/19  0340      K 4.5      CO2 28   BUN 8   CREATININE 0.9   GLUCOSE 103*   CALCIUM 9.1       Recent Labs     04/08/19  0340   WBC 15.2*   RBC 4.71   HGB 12.6   HCT 38.2   MCV 81.1

## 2019-04-08 NOTE — PROGRESS NOTES
[address] [place] [address] [facility]  Progress Note    SUBJECTIVE:  Chief Complaint   Patient presents with    Nausea    Dizziness    Fever    Other     states lymph nodes swollen all over body     Throat is feeling better. He still has painful nodes around neck area. No fevers. Still complaining of some but no vomiting. Review of systems:  As stated above in the chief complaint, otherwise negative. Medications:  Scheduled Meds:   nicotine  1 patch Transdermal Daily    sodium chloride flush  10 mL Intravenous 2 times per day    enoxaparin  40 mg Subcutaneous Daily     Continuous Infusions:  PRN Meds:ibuprofen, sodium chloride flush, acetaminophen, ondansetron, HYDROcodone 5 mg - acetaminophen, diphenhydrAMINE, ALPRAZolam, albuterol sulfate HFA    OBJECTIVE:  /62   Pulse 102   Temp 98.3 °F (36.8 °C) (Oral)   Resp 18   Ht 5' 4\" (1.626 m)   Wt 256 lb (116.1 kg)   LMP 2019 (Approximate)   SpO2 98%   BMI 43.94 kg/m²   Temp  Av.2 °F (36.8 °C)  Min: 98.1 °F (36.7 °C)  Max: 98.3 °F (36.8 °C)  Constitutional: Patient is awake and alert. No distress. Skin: Form and dry. No rashes noted. HEENT: No jaundice. No thrush. Neck: Supple to movements. Enlarged tender bilateral submandibular nodes, enlarged tender left occipital node. No change. Chest: Good breath sounds. Cardiovascular: Heart sounds rhythmic and regular. Abdomen: Wound, large. Positive bowel sounds  Extremities: No edema.   Lines: peripheral    Laboratory and Tests Review:  Lab Results   Component Value Date    WBC 15.2 (H) 2019    WBC 12.2 (H) 2019    WBC 16.3 (H) 2019    HGB 12.6 2019    HCT 38.2 2019    MCV 81.1 2019     2019     Lab Results   Component Value Date    NEUTROABS 5.93 2019    NEUTROABS 9.79 (H) 2019    NEUTROABS 4.24 2019     No results found for: CRP  No results found for: CRPHS  No results found for: Dyane Fess  Lab Results Component Value Date    ALT 91 (H) 04/05/2019    AST 42 (H) 04/05/2019    ALKPHOS 110 (H) 04/05/2019    BILITOT 0.3 04/05/2019     Lab Results   Component Value Date     04/08/2019    K 4.5 04/08/2019    K 3.4 04/04/2019     04/08/2019    CO2 28 04/08/2019    BUN 8 04/08/2019    CREATININE 0.9 04/08/2019    CREATININE 1.1 04/04/2019    CREATININE 0.9 03/29/2019    GFRAA >60 04/08/2019    LABGLOM >60 04/08/2019    GLUCOSE 103 04/08/2019    GLUCOSE 81 01/06/2012    PROT 7.1 04/05/2019    LABALBU 3.4 04/05/2019    CALCIUM 9.1 04/08/2019    BILITOT 0.3 04/05/2019    ALKPHOS 110 04/05/2019    AST 42 04/05/2019    ALT 91 04/05/2019     Radiology:      Microbiology:   Respiratory panel: Parainfluenza 2  HIV antibodies: Pending  Acute hepatitis panel: Pending  T spot: Pending  EBV antibodies: Pending  Blood cultures 4/4/19: Negative so far  Urine culture 4/5/19 <10K mixed GPO  Influenza by PCR: Negative  Monospot 3/20/19: Negative  Toxoplasma antibodies: Pending    ASSESSMENT:  · Diffuse lymphadenopathy. The differential includes HIV infection, Beverly-Barr virus, lymphoma, toxoplasmosis, sarcoid, mycobacterial infections, cat scratch disease  · Parainfluenza 2 viral infection. This seems to be unrelated to the above. It would be an unusual manifestation of this infection     Plan:    · Continue supportive treatment  · F/u ACE level, Beverly-Barr antibodies, CMV antibodies, toxoplasma antibodies, AMENA, RPR, T spot  · Surgery has been consulted for bx - d/w Dr. Bandar Moore. · Patient can be discharged after biopsy.  She was instructed to call the office and make a follow-up appointment    Priti Chavez  2:01 PM  4/8/2019

## 2019-04-08 NOTE — PATIENT CARE CONFERENCE
[facility] Quality Flow/Interdisciplinary Rounds Progress Note        Quality Flow Rounds held on April 8, 2019    Disciplines Attending:  Bedside Nurse, ,  and Nursing Unit Leadership    Renate Henson was admitted on 4/4/2019  6:26 PM    Anticipated Discharge Date:  Expected Discharge Date: 04/08/19    Disposition:    Juan Score:  Juan Scale Score: 22    Readmission Risk              Risk of Unplanned Readmission:        11           Discussed patient goal for the day, patient clinical progression, and barriers to discharge.   The following Goal(s) of the Day/Commitment(s) have been identified:  Possible discharge        Evy Carr  April 8, 2019

## 2019-04-08 NOTE — ANESTHESIA PRE PROCEDURE
Department of Anesthesiology  Preprocedure Note       Name:  Angle Marquez   Age:  35 y.o.  :  1985                                          MRN:  [id number]         Date:  2019      Surgeon: Milla Hanson):  Deloris Scherer MD    Procedure: LEFT OCCIPITAL LYMPH NODE BIOPSY (PATHOLOGY NOTIFIED) (Left )    Medications prior to admission:   Prior to Admission medications    Medication Sig Start Date End Date Taking? Authorizing Provider   predniSONE (DELTASONE) 20 MG tablet Take 3 tablets by mouth daily for 3 days, THEN 2 tablets daily for 3 days, THEN 1 tablet daily for 3 days. Patient taking differently: Take 3 tablets by mouth daily for 3 days, THEN 2 tablets daily for 3 days, THEN 1 tablet daily for 3 days.  to start on 19 Yes Kailey Fox MD   famotidine (PEPCID) 40 MG tablet Take 1 tablet by mouth nightly 19  Yes Kailey Fox MD   cetirizine (ZYRTEC) 10 MG tablet Take 1 tablet by mouth daily 3/29/19 4/28/19 Yes ARNOLD Watkins CNP   ibuprofen (ADVIL;MOTRIN) 800 MG tablet Take 1 tablet by mouth every 6 hours as needed for Pain 3/29/19 4/4/19 Yes ARNOLD Watkins CNP   diclofenac (VOLTAREN) 50 MG EC tablet take 1 tablet by mouth twice a day with meals 10/25/18  Yes Yvonne Garcia PA-C   albuterol sulfate HFA (PROVENTIL HFA) 108 (90 Base) MCG/ACT inhaler Inhale 2 puffs into the lungs every 4 hours as needed for Wheezing 10/12/18 10/12/19 Yes Carlos Eduardo Hook DO   valACYclovir (VALTREX) 500 MG tablet Take 500 mg by mouth daily  18  Yes Historical Provider, MD   acyclovir (ZOVIRAX) 400 MG tablet Take 400 mg by mouth 2 times daily  18  Yes Historical Provider, MD       Current medications:    Current Facility-Administered Medications   Medication Dose Route Frequency Provider Last Rate Last Dose    ibuprofen (ADVIL;MOTRIN) tablet 600 mg  600 mg Oral Q8H PRN Rajeshkumar Dallis Harada, MD   600 mg at 19 0753    nicotine (NICODERM CQ) 14 MG/24HR 1 patch 1 patch Transdermal Daily Zeinab Marrero MD   1 patch at 04/08/19 0754    sodium chloride flush 0.9 % injection 10 mL  10 mL Intravenous 2 times per day Ananth Hric, DO   10 mL at 04/08/19 0753    sodium chloride flush 0.9 % injection 10 mL  10 mL Intravenous PRN Ananth Hric, DO   10 mL at 04/06/19 2047    acetaminophen (TYLENOL) tablet 650 mg  650 mg Oral Q4H PRN Ananth Hric, DO   650 mg at 04/06/19 0627    enoxaparin (LOVENOX) injection 40 mg  40 mg Subcutaneous Daily Ananth Hric, DO        ondansetron (ZOFRAN) injection 4 mg  4 mg Intravenous Q6H PRN Jaylen Dejesus MD        HYDROcodone-acetaminophen [facility]) 5-325 MG per tablet 1 tablet  1 tablet Oral Q6H PRN Jaylen Dejesus MD   1 tablet at 04/07/19 1500    diphenhydrAMINE (BENADRYL) injection 25 mg  25 mg Intravenous Q6H PRN Jaylen Dejesus MD   25 mg at 04/07/19 2139    ALPRAZolam (XANAX) tablet 0.25 mg  0.25 mg Oral 4x Daily PRN Zeinab Marrero MD   0.25 mg at 04/07/19 0914    albuterol sulfate  (90 Base) MCG/ACT inhaler 2 puff  2 puff Inhalation Q4H PRN Chandni Gipson MD   2 puff at 04/06/19 1310       Allergies:     Allergies   Allergen Reactions    Codeine Other (See Comments)     Gets \"zonked out\"    Penicillins Other (See Comments)     Gets \"zonked out\"    Toradol [Ketorolac Tromethamine] Itching and Swelling     Lip swelling    Azithromycin Itching and Rash       Problem List:    Patient Active Problem List   Diagnosis Code    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 [id number]    Infertility, female N97.9    Post-traumatic arthritis of right ankle M19.171    History of abnormal cervical Papanicolaou smear Z87.898    Dehydration E86.0    Diffuse lymphadenopathy R59.1    Nausea & vomiting R11.2    Hepatitis K75.9    Adverse drug reaction T50.905A       Past Medical History:        Diagnosis Date    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 Coags: No results found for: PROTIME, INR, APTT    HCG (If Applicable):   Lab Results   Component Value Date    PREGTESTUR negative 07/27/2017        ABGs: No results found for: PHART, PO2ART, KEV8OPN, XIU8HER, BEART, M4PTVYQX     Type & Screen (If Applicable):  No results found for: LABABO, LABRH    EKG 4/4/19  Sinus tachycardia ()  Possible Left atrial enlargement  Nonspecific T wave abnormality    CTA 3/29/19  Bilateral axillary lymphadenopathy.       No evidence for pulmonary embolism or aortic dissection. Anesthesia Evaluation  Patient summary reviewed and Nursing notes reviewed no history of anesthetic complications:   Airway: Mallampati: II  TM distance: >3 FB   Neck ROM: limited  Comment: Hurts to move neck due to swollen lymph nodes. Mouth opening: > = 3 FB Dental:          Pulmonary: breath sounds clear to auscultation  (+) asthma (last believed asthma attack was March 25th.): allergic asthma,                           ROS comment: Former smoker. Current vapor pen use. Has not 'vaped' in 3-4 days (since admission to hospital). Currently has a nicotine patch in place.    Marijuana use (occassional/recreational)   Cardiovascular:Negative CV ROS          ECG reviewed  Rhythm: regular  Rate: normal                    Neuro/Psych:   (+) neuromuscular disease (periodic numbness in right ankle.):, headaches: migraine headaches, depression/anxiety  (pt has anxiety attacks)            GI/Hepatic/Renal:   (+) GERD:, hepatitis (pt lab results indicate Hepatitis A, B, C, and D screens were nonreactive.):, morbid obesity          Endo/Other:    (+) : arthritis (right leg knee and ankle): OA., .                  ROS comment: Diffuse lymphadenopathy Abdominal: (+) obese,     Abdomen: soft. Vascular: negative vascular ROS. Anesthesia Plan      MAC     ASA 3     (#22 in R forearm  Pt is in contact and droplet isolation for Parainfluenza A.)  Induction: intravenous. Anesthetic plan and risks discussed with patient. Use of blood products discussed with patient whom consented to blood products. Plan discussed with CRNA and attending. Chris Natarajan RN   4/8/2019    Pt seen, examined, chart reviewed, plan discussed.   [facility]  4/9/2019  9:40 AM

## 2019-04-08 NOTE — PLAN OF CARE
Problem: Fluid Volume:  Goal: Signs and symptoms of dehydration will decrease  Description  Signs and symptoms of dehydration will decrease  4/8/2019 1808 by Bolling Aase, RN  Outcome: Met This Shift  4/8/2019 0410 by Ced Mcintosh RN  Outcome: Met This Shift  Goal: Maintenance of adequate hydration will improve  Description  Maintenance of adequate hydration will improve  Outcome: Met This Shift     Problem: Anxiety:  Goal: Level of anxiety will decrease  Description  Level of anxiety will decrease  4/8/2019 1808 by Bolling Aase, RN  Outcome: Met This Shift  4/8/2019 0410 by Ced Mcintosh RN  Outcome: Met This Shift     Problem: Tobacco Use:  Goal: Inpatient tobacco use cessation counseling participation  Description  Inpatient tobacco use cessation counseling participation  Outcome: Met This Shift

## 2019-04-09 ENCOUNTER — ANESTHESIA (OUTPATIENT)
Dept: OPERATING ROOM | Age: 34
End: 2019-04-09
Payer: MEDICAID

## 2019-04-09 VITALS
HEART RATE: 106 BPM | OXYGEN SATURATION: 96 % | DIASTOLIC BLOOD PRESSURE: 82 MMHG | RESPIRATION RATE: 18 BRPM | SYSTOLIC BLOOD PRESSURE: 132 MMHG | BODY MASS INDEX: 43.87 KG/M2 | WEIGHT: 257 LBS | TEMPERATURE: 98.3 F | HEIGHT: 64 IN

## 2019-04-09 VITALS — OXYGEN SATURATION: 98 % | DIASTOLIC BLOOD PRESSURE: 67 MMHG | SYSTOLIC BLOOD PRESSURE: 130 MMHG

## 2019-04-09 LAB
ANGIOTENSIN CONVERTING ENZYME: 40 U/L (ref 9–67)
BLOOD CULTURE, ROUTINE: NORMAL
CULTURE, BLOOD 2: NORMAL
EPSTEIN BARR VIRUS NUCLEAR AB IGG: >600 U/ML (ref 0–21.9)
EPSTEIN-BARR EARLY ANTIGEN ANTIBODY: 8.5 U/ML (ref 0–10.9)
EPSTEIN-BARR VCA IGM: <10 U/ML (ref 0–43.9)
HCG(URINE) PREGNANCY TEST: NEGATIVE
MUMPS IGM ANTIBODY: 0.66 IV
RUBELLA IGM: <10 AU/ML

## 2019-04-09 PROCEDURE — 88342 IMHCHEM/IMCYTCHM 1ST ANTB: CPT

## 2019-04-09 PROCEDURE — 88305 TISSUE EXAM BY PATHOLOGIST: CPT

## 2019-04-09 PROCEDURE — 87015 SPECIMEN INFECT AGNT CONCNTJ: CPT

## 2019-04-09 PROCEDURE — 87176 TISSUE HOMOGENIZATION CULTR: CPT

## 2019-04-09 PROCEDURE — 87205 SMEAR GRAM STAIN: CPT

## 2019-04-09 PROCEDURE — 88331 PATH CONSLTJ SURG 1 BLK 1SPC: CPT

## 2019-04-09 PROCEDURE — 87070 CULTURE OTHR SPECIMN AEROBIC: CPT

## 2019-04-09 PROCEDURE — 87075 CULTR BACTERIA EXCEPT BLOOD: CPT

## 2019-04-09 PROCEDURE — 2709999900 HC NON-CHARGEABLE SUPPLY: Performed by: SURGERY

## 2019-04-09 PROCEDURE — 88312 SPECIAL STAINS GROUP 1: CPT

## 2019-04-09 PROCEDURE — 2580000003 HC RX 258: Performed by: NURSE ANESTHETIST, CERTIFIED REGISTERED

## 2019-04-09 PROCEDURE — 3700000001 HC ADD 15 MINUTES (ANESTHESIA): Performed by: SURGERY

## 2019-04-09 PROCEDURE — 6360000002 HC RX W HCPCS: Performed by: ANESTHESIOLOGY

## 2019-04-09 PROCEDURE — 3600000012 HC SURGERY LEVEL 2 ADDTL 15MIN: Performed by: SURGERY

## 2019-04-09 PROCEDURE — 2580000003 HC RX 258: Performed by: STUDENT IN AN ORGANIZED HEALTH CARE EDUCATION/TRAINING PROGRAM

## 2019-04-09 PROCEDURE — 2500000003 HC RX 250 WO HCPCS: Performed by: SURGERY

## 2019-04-09 PROCEDURE — 7100000011 HC PHASE II RECOVERY - ADDTL 15 MIN: Performed by: SURGERY

## 2019-04-09 PROCEDURE — 6370000000 HC RX 637 (ALT 250 FOR IP): Performed by: STUDENT IN AN ORGANIZED HEALTH CARE EDUCATION/TRAINING PROGRAM

## 2019-04-09 PROCEDURE — 87116 MYCOBACTERIA CULTURE: CPT

## 2019-04-09 PROCEDURE — 3700000000 HC ANESTHESIA ATTENDED CARE: Performed by: SURGERY

## 2019-04-09 PROCEDURE — 3600000002 HC SURGERY LEVEL 2 BASE: Performed by: SURGERY

## 2019-04-09 PROCEDURE — 7100000010 HC PHASE II RECOVERY - FIRST 15 MIN: Performed by: SURGERY

## 2019-04-09 PROCEDURE — G0378 HOSPITAL OBSERVATION PER HR: HCPCS

## 2019-04-09 PROCEDURE — 88341 IMHCHEM/IMCYTCHM EA ADD ANTB: CPT

## 2019-04-09 PROCEDURE — 87206 SMEAR FLUORESCENT/ACID STAI: CPT

## 2019-04-09 PROCEDURE — 6360000002 HC RX W HCPCS: Performed by: NURSE ANESTHETIST, CERTIFIED REGISTERED

## 2019-04-09 PROCEDURE — 81025 URINE PREGNANCY TEST: CPT

## 2019-04-09 PROCEDURE — 87102 FUNGUS ISOLATION CULTURE: CPT

## 2019-04-09 PROCEDURE — 99239 HOSP IP/OBS DSCHRG MGMT >30: CPT | Performed by: INTERNAL MEDICINE

## 2019-04-09 PROCEDURE — 6360000002 HC RX W HCPCS: Performed by: SURGERY

## 2019-04-09 RX ORDER — BENZONATATE 100 MG/1
200 CAPSULE ORAL 3 TIMES DAILY PRN
Qty: 21 CAPSULE | Refills: 0 | Status: SHIPPED | OUTPATIENT
Start: 2019-04-09 | End: 2019-04-16

## 2019-04-09 RX ORDER — CEFAZOLIN SODIUM 2 G/50ML
2 SOLUTION INTRAVENOUS ONCE
Status: COMPLETED | OUTPATIENT
Start: 2019-04-09 | End: 2019-04-09

## 2019-04-09 RX ORDER — LIDOCAINE HYDROCHLORIDE AND EPINEPHRINE 10; 10 MG/ML; UG/ML
INJECTION, SOLUTION INFILTRATION; PERINEURAL PRN
Status: DISCONTINUED | OUTPATIENT
Start: 2019-04-09 | End: 2019-04-09 | Stop reason: ALTCHOICE

## 2019-04-09 RX ORDER — CEFAZOLIN SODIUM 2 G/50ML
SOLUTION INTRAVENOUS
Status: DISCONTINUED
Start: 2019-04-09 | End: 2019-04-09

## 2019-04-09 RX ORDER — MIDAZOLAM HYDROCHLORIDE 1 MG/ML
INJECTION INTRAMUSCULAR; INTRAVENOUS PRN
Status: DISCONTINUED | OUTPATIENT
Start: 2019-04-09 | End: 2019-04-09 | Stop reason: SDUPTHER

## 2019-04-09 RX ORDER — PROPOFOL 10 MG/ML
INJECTION, EMULSION INTRAVENOUS CONTINUOUS PRN
Status: DISCONTINUED | OUTPATIENT
Start: 2019-04-09 | End: 2019-04-09 | Stop reason: SDUPTHER

## 2019-04-09 RX ORDER — PROPOFOL 10 MG/ML
INJECTION, EMULSION INTRAVENOUS PRN
Status: DISCONTINUED | OUTPATIENT
Start: 2019-04-09 | End: 2019-04-09 | Stop reason: SDUPTHER

## 2019-04-09 RX ORDER — DIPHENHYDRAMINE HYDROCHLORIDE 50 MG/ML
12.5 INJECTION INTRAMUSCULAR; INTRAVENOUS ONCE
Status: COMPLETED | OUTPATIENT
Start: 2019-04-09 | End: 2019-04-09

## 2019-04-09 RX ORDER — FENTANYL CITRATE 50 UG/ML
INJECTION, SOLUTION INTRAMUSCULAR; INTRAVENOUS PRN
Status: DISCONTINUED | OUTPATIENT
Start: 2019-04-09 | End: 2019-04-09 | Stop reason: SDUPTHER

## 2019-04-09 RX ORDER — BENZONATATE 100 MG/1
100-200 CAPSULE ORAL 3 TIMES DAILY PRN
Qty: 60 CAPSULE | Refills: 0 | Status: SHIPPED | OUTPATIENT
Start: 2019-04-09 | End: 2019-04-16

## 2019-04-09 RX ORDER — SODIUM CHLORIDE 9 MG/ML
INJECTION, SOLUTION INTRAVENOUS CONTINUOUS PRN
Status: DISCONTINUED | OUTPATIENT
Start: 2019-04-09 | End: 2019-04-09 | Stop reason: SDUPTHER

## 2019-04-09 RX ORDER — HYDROCODONE BITARTRATE AND ACETAMINOPHEN 5; 325 MG/1; MG/1
1 TABLET ORAL EVERY 6 HOURS PRN
Qty: 20 TABLET | Refills: 0 | Status: SHIPPED | OUTPATIENT
Start: 2019-04-09 | End: 2019-04-14

## 2019-04-09 RX ADMIN — SODIUM CHLORIDE: 9 INJECTION, SOLUTION INTRAVENOUS at 12:00

## 2019-04-09 RX ADMIN — HYDROCODONE BITARTRATE AND ACETAMINOPHEN 1 TABLET: 5; 325 TABLET ORAL at 15:20

## 2019-04-09 RX ADMIN — Medication 10 ML: at 14:21

## 2019-04-09 RX ADMIN — FENTANYL CITRATE 50 MCG: 50 INJECTION, SOLUTION INTRAMUSCULAR; INTRAVENOUS at 12:14

## 2019-04-09 RX ADMIN — DIPHENHYDRAMINE HYDROCHLORIDE 12.5 MG: 50 INJECTION INTRAMUSCULAR; INTRAVENOUS at 11:26

## 2019-04-09 RX ADMIN — MIDAZOLAM HYDROCHLORIDE 2 MG: 1 INJECTION, SOLUTION INTRAMUSCULAR; INTRAVENOUS at 12:07

## 2019-04-09 RX ADMIN — PROPOFOL 50 MG: 10 INJECTION, EMULSION INTRAVENOUS at 12:14

## 2019-04-09 RX ADMIN — CEFAZOLIN SODIUM 2 G: 2 SOLUTION INTRAVENOUS at 12:06

## 2019-04-09 RX ADMIN — PROPOFOL 99 MCG/KG/MIN: 10 INJECTION, EMULSION INTRAVENOUS at 12:14

## 2019-04-09 ASSESSMENT — PAIN SCALES - GENERAL
PAINLEVEL_OUTOF10: 0
PAINLEVEL_OUTOF10: 0
PAINLEVEL_OUTOF10: 7
PAINLEVEL_OUTOF10: 0
PAINLEVEL_OUTOF10: 5

## 2019-04-09 ASSESSMENT — PULMONARY FUNCTION TESTS
PIF_VALUE: 0
PIF_VALUE: 1
PIF_VALUE: 0
PIF_VALUE: 1
PIF_VALUE: 0
PIF_VALUE: 1
PIF_VALUE: 0
PIF_VALUE: 0
PIF_VALUE: 1
PIF_VALUE: 0

## 2019-04-09 ASSESSMENT — PAIN DESCRIPTION - DESCRIPTORS: DESCRIPTORS: HEADACHE

## 2019-04-09 ASSESSMENT — PAIN - FUNCTIONAL ASSESSMENT: PAIN_FUNCTIONAL_ASSESSMENT: 0-10

## 2019-04-09 NOTE — PROGRESS NOTES
INTRAOPERATIVE CONSULTATION (with FROZEN SECTION)    Occipital lymph node, biopsy: Completely necrotic nodular tissue (infarcted node?).

## 2019-04-09 NOTE — PROGRESS NOTES
Nursing Transfer Note    Data:  Summary of patients progress: S/P occipital lymph node biopsy  Reason for transfer: inpatient    Action:  Explained reason for transfer to Patient and Family. Report given to: 5th floor RN, using RN Handoff Navigator. Mode of transportation: cart    Response:  RN Recommendations: see orders/notes/MAR.

## 2019-04-09 NOTE — PROGRESS NOTES
GENERAL SURGERY  DAILY PROGRESS NOTE  4/9/2019    Chief Complaint   Patient presents with    Nausea    Dizziness    Fever    Other     states lymph nodes swollen all over body       Subjective:  NAEON. Pain controlled.      Objective:  BP (!) 138/90   Pulse 120   Temp 97.9 °F (36.6 °C) (Oral)   Resp 18   Ht 5' 4\" (1.626 m)   Wt 257 lb 6.4 oz (116.8 kg)   LMP 03/12/2019 (Approximate)   SpO2 97%   BMI 44.18 kg/m²     GENERAL:  Laying in bed, awake, alert, cooperative, no apparent distress  HEAD: Normocephalic, atraumatic  EYES: No sclera icterus, pupils equal  LUNGS:  No increased work of breathing  CARDIOVASCULAR:  RR  ABDOMEN:  Soft, non-tender, non-distended  EXTREMITIES: No edema or swelling  SKIN: Warm and dry    Assessment/Plan:  35 y.o. female with lymphadenopathy     Plan for left occipital lymph node biopsy  NPO until procedure  IVF  Pain and nausea control      Electronically signed by Blu Conley MD on 4/9/2019 at 7:24 AM

## 2019-04-09 NOTE — PROGRESS NOTES
Pt is refusing her pneumonia vaccine.  Electronically signed by Aurea Barthel, RN on 4/9/2019 at 5:03 PM LR

## 2019-04-09 NOTE — DISCHARGE SUMMARY
[facility] Physician Discharge Summary       Verna Howard, [address] [facility], [address] [place] [address]    In 1 week  As needed, call for results of biopsy    Ajit Lobo MD  [address] [facility] [address]  [address]  [phone]    In 1 week  lymphnode biopsy      Activity level: as tolerated    Diet: DIET GENERAL;    Dispo: home      Condition on discharge: stable    Patient ID:  Darion Bradley  [id number]  35 y.o.  1985    Admit date: 4/4/2019    Discharge date and time:  4/9/2019  5:54 PM    Admission Diagnoses: Principal Problem:    Diffuse lymphadenopathy  Active Problems:    Dehydration    Nausea & vomiting    Hepatitis    Adverse drug reaction  Resolved Problems:    * No resolved hospital problems. *      Discharge Diagnoses: Principal Problem:    Diffuse lymphadenopathy  Active Problems:    Dehydration    Nausea & vomiting    Hepatitis    Adverse drug reaction  Resolved Problems:    * No resolved hospital problems. *      Consults:  IP CONSULT TO INFECTIOUS DISEASES  IP CONSULT TO SOCIAL WORK  IP CONSULT TO GENERAL SURGERY    Procedures: lymph node biopsy. Hospital Course:   Patient Darion Bradley is a 35 y.o. presented with cough and congestion as well as diffuse neck lymphadenopathy, we admit the patient for evaluation to different for the following    1. Diffuse lymphadenopathy, ruling out HIV, toxo, EBV, appreciate ID input, not liekly to be related to parainfluenza virus infection, consider lymphoma, we obtained a a lymph node excisional biopsy, appreciate surgery input, biopsy was done on 4/9/19. Preliminary report showing necrotic tissue. Patient to follow-up as an outpatient with surgery as well as ID for the final results of the biopsy  2. Parainfluenza 2, contact and droplet percaution  3. Transaminitis, improving.   4.  Asthma, diclofenac 50 MG EC tablet  Commonly known as:  VOLTAREN  take 1 tablet by mouth twice a day with meals     famotidine 40 MG tablet  Commonly known as:  PEPCID  Take 1 tablet by mouth nightly     ibuprofen 800 MG tablet  Commonly known as:  ADVIL;MOTRIN  Take 1 tablet by mouth every 6 hours as needed for Pain     valACYclovir 500 MG tablet  Commonly known as:  VALTREX         * This list has 2 medication(s) that are the same as other medications prescribed for you. Read the directions carefully, and ask your doctor or other care provider to review them with you.             STOP taking these medications    azithromycin 1 g powder  Commonly known as:  ZITHROMAX     ondansetron 4 MG disintegrating tablet  Commonly known as:  ZOFRAN ODT           Where to Get Your Medications      These medications [facility] to [facility] [address], [address] [place] [address]    Phone:  [phone]   · benzonatate 100 MG capsule  · benzonatate 100 MG capsule     You can get these medications from any pharmacy    Bring a paper prescription for each of these medications  · HYDROcodone-acetaminophen 5-325 MG per tablet           Note that more than 30 minutes was spent in preparing discharge papers, discussing discharge with patient, medication review, etc.    Signed:  Electronically signed by Candi Childress MD on 4/9/2019 at 5:54 PM

## 2019-04-10 ENCOUNTER — TELEPHONE (OUTPATIENT)
Dept: FAMILY MEDICINE CLINIC | Age: 34
End: 2019-04-10

## 2019-04-10 LAB — GRAM STAIN ORDERABLE: NORMAL

## 2019-04-10 NOTE — ANESTHESIA POSTPROCEDURE EVALUATION
Department of Anesthesiology  Postprocedure Note    Patient: Rosie Caldwell  MRN: [id number]  YOB: 1985  Date of evaluation: 4/9/2019  Time:  10:32 PM     Procedure Summary     Date:  04/09/19 Room / Location:  [facility] OR  / [facility] OR    Anesthesia Start:  9293 Anesthesia Stop:  5085    Procedure:  LEFT OCCIPITAL LYMPH NODE BIOPSY (PATHOLOGY NOTIFIED) +ISO+ (Left ) Diagnosis:  (-)    Surgeon:  Heather James MD Responsible Provider:  Funmilayo Russ MD    Anesthesia Type:  MAC ASA Status:  3          Anesthesia Type: MAC    Homer Phase I: Homer Score: 10    Homer Phase II: Homer Score: 10    Last vitals: Reviewed and per EMR flowsheets.        Anesthesia Post Evaluation    Patient participation: complete - patient participated  Level of consciousness: awake  Airway patency: patent  Nausea & Vomiting: no nausea and no vomiting  Complications: no  Cardiovascular status: hemodynamically stable  Respiratory status: acceptable  Hydration status: stable

## 2019-04-11 LAB
CULTURE SURGICAL: NORMAL
GRAM STAIN RESULT: NORMAL
TOXOPLASMA IGM ANTIBODY: NORMAL
TOXOPLASMOSIS IGG AB: NORMAL

## 2019-04-12 LAB
BARTONELLA HENSELAE AB, IGG: NORMAL
BARTONELLA HENSELAE AB, IGM: NORMAL

## 2019-04-12 NOTE — OP NOTE
[address] [address]                                OPERATIVE REPORT    PATIENT NAME: Argentina Grace                   :        1985  MED REC NO:   [id number]                            ROOM:       8801  ACCOUNT NO:   [de-identified]                           ADMIT DATE: 2019  PROVIDER:     Dara Adorno MD    DATE OF PROCEDURE:  2019    PREOPERATIVE DIAGNOSIS:  Lymphadenopathy. POSTOPERATIVE DIAGNOSIS:  Lymphadenopathy. PROCEDURE PERFORMED:  Excisional biopsy of left occipital node. SURGEON:  Dara Adorno MD.    ANESTHESIA:  LMAC. SPECIMENS:  Left occipital node. COMPLICATIONS:  None. CLINICAL NOTE:  A 63-year-old female was admitted to the hospital with  various symptoms and has diffuse lymphadenopathy, mostly in the proximal  cervical chains bilaterally, also a large left occipital node. All of  these nodes are tender. The proximal cervical nodes are near the  marginal mandibular nerve and I thought the left occipital nerve was the  best node for biopsy within the least amount of potential complications. Risks, benefits, and alternatives were discussed with the patient. DESCRIPTION OF PROCEDURE:  The patient was taken to the operating room,  placed on the operating table in a right lateral decubitus position. LMAC anesthesia was administered. The neck was prepped and draped in  the usual sterile fashion. An incision was marked with a marking pen. Local anesthetic was injected. The skin was divided with a 15 blade,  divided the soft tissues with the 15 blade, then using blunt dissection,  the nerve was easy to identify to its size. It was grasped with a  Debakey and a hemostat was used to dissect around the node. The hilum  was clamped and the node was delivered as a specimen. The hilum was  cauterized.   The wound was irrigated and closed with 3-0 Vicryl, 4-0  Vicryl, and Dermabond. The patient tolerated the procedure and went to  Recovery.         Alexis Marin MD    D: 04/11/2019 23:16:28       T: 04/12/2019 1:17:51     [email or web address]  Job#: [id number]     Doc#: [id number]    CC:

## 2019-04-14 LAB — ANAEROBIC CULTURE: NORMAL

## 2019-04-17 LAB
EKG ATRIAL RATE: 125 BPM
EKG P AXIS: 50 DEGREES
EKG P-R INTERVAL: 142 MS
EKG Q-T INTERVAL: 286 MS
EKG QRS DURATION: 80 MS
EKG QTC CALCULATION (BAZETT): 412 MS
EKG R AXIS: 64 DEGREES
EKG T AXIS: 24 DEGREES
EKG VENTRICULAR RATE: 125 BPM

## 2019-04-22 ENCOUNTER — OFFICE VISIT (OUTPATIENT)
Dept: FAMILY MEDICINE CLINIC | Age: 34
End: 2019-04-22
Payer: MEDICAID

## 2019-04-22 VITALS
BODY MASS INDEX: 44.73 KG/M2 | HEIGHT: 64 IN | SYSTOLIC BLOOD PRESSURE: 132 MMHG | WEIGHT: 262 LBS | RESPIRATION RATE: 18 BRPM | OXYGEN SATURATION: 97 % | DIASTOLIC BLOOD PRESSURE: 74 MMHG | HEART RATE: 94 BPM | TEMPERATURE: 98.4 F

## 2019-04-22 DIAGNOSIS — R59.1 DIFFUSE LYMPHADENOPATHY: Primary | ICD-10-CM

## 2019-04-22 PROBLEM — K75.9 HEPATITIS: Status: RESOLVED | Noted: 2019-04-05 | Resolved: 2019-04-22

## 2019-04-22 PROCEDURE — 99214 OFFICE O/P EST MOD 30 MIN: CPT | Performed by: FAMILY MEDICINE

## 2019-04-22 RX ORDER — DIPHENHYDRAMINE HCL 25 MG
25 CAPSULE ORAL EVERY 6 HOURS PRN
COMMUNITY

## 2019-04-22 RX ORDER — PANTOPRAZOLE SODIUM 40 MG/1
40 TABLET, DELAYED RELEASE ORAL
Qty: 30 TABLET | Refills: 3 | Status: SHIPPED
Start: 2019-04-22 | End: 2021-02-09 | Stop reason: DRUGHIGH

## 2019-04-22 RX ORDER — BENZONATATE 100 MG/1
100 CAPSULE ORAL 3 TIMES DAILY PRN
COMMUNITY
End: 2021-01-06

## 2019-04-22 RX ORDER — ESCITALOPRAM OXALATE 10 MG/1
10 TABLET ORAL DAILY
Qty: 30 TABLET | Refills: 3 | Status: SHIPPED | OUTPATIENT
Start: 2019-04-22 | End: 2019-07-03 | Stop reason: SDUPTHER

## 2019-04-28 ASSESSMENT — ENCOUNTER SYMPTOMS
SINUS PAIN: 0
CONSTIPATION: 0
COLOR CHANGE: 0
BACK PAIN: 0
WHEEZING: 0
TROUBLE SWALLOWING: 0
SINUS PRESSURE: 0
SHORTNESS OF BREATH: 0
VOMITING: 0
SORE THROAT: 0
COUGH: 1
ABDOMINAL PAIN: 0
DIARRHEA: 0
NAUSEA: 0
RHINORRHEA: 1
BLOOD IN STOOL: 0
EYE REDNESS: 0
CHEST TIGHTNESS: 0

## 2019-05-02 PROBLEM — R59.1 DIFFUSE LYMPHADENOPATHY: Status: RESOLVED | Noted: 2019-04-05 | Resolved: 2019-05-02

## 2019-05-05 PROBLEM — E86.0 DEHYDRATION: Status: RESOLVED | Noted: 2019-04-05 | Resolved: 2019-05-05

## 2019-05-11 ASSESSMENT — ENCOUNTER SYMPTOMS
NAUSEA: 0
COLOR CHANGE: 0
CHEST TIGHTNESS: 0
WHEEZING: 0
BACK PAIN: 0
BLOOD IN STOOL: 0
VOMITING: 0
ABDOMINAL PAIN: 0
EYE REDNESS: 0
DIARRHEA: 0
SHORTNESS OF BREATH: 0
COUGH: 0
CONSTIPATION: 0

## 2019-05-11 NOTE — PROGRESS NOTES
Post-Discharge Transitional Care Management Services or Hospital Follow Up      Renate Risk   YOB: 1985    Date of Office Visit:  4/22/2019  Date of Hospital Admission: 4/4/19  Date of Hospital Discharge: 4/9/19  Readmission Risk Score(high >=14%.  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    Infertility, female    Post-traumatic arthritis of right ankle    History of abnormal cervical Papanicolaou smear    Nausea & vomiting    Adverse drug reaction       Allergies   Allergen Reactions    Latex      Pt develops rash and instructed per dr to use non latex items    Codeine Other (See Comments)     Gets \"zonked out\"    Penicillins Other (See Comments)     Gets \"zonked out\"    Toradol [Ketorolac Tromethamine] Itching and Swelling     Lip swelling    Azithromycin Itching and Rash       Medications listed as ordered at the time of discharge from hospital   [facility]. Medication Instructions MEY:    Printed on:05/11/19 6355   Medication Information                      acyclovir (ZOVIRAX) 400 MG tablet  Take 400 mg by mouth 2 times daily              albuterol sulfate HFA (PROVENTIL HFA) 108 (90 Base) MCG/ACT inhaler  Inhale 2 puffs into the lungs every 4 hours as needed for Wheezing             benzonatate (TESSALON) 100 MG capsule  Take 100 mg by mouth 3 times daily as needed for Cough             diphenhydrAMINE (BENADRYL) 25 MG capsule  Take 25 mg by mouth every 6 hours as needed for Itching             escitalopram (LEXAPRO) 10 MG tablet  Take 1 tablet by mouth daily             ibuprofen (ADVIL;MOTRIN) 800 MG tablet  Take 1 tablet by mouth every 6 hours as needed for Pain             pantoprazole (PROTONIX) 40 MG tablet  Take 1 tablet by mouth daily (with breakfast)             valACYclovir (VALTREX) 500 MG tablet  Take 500 mg by mouth daily                    Medications marked \"taking\" at this time  Outpatient Medications Marked as Taking for the 19 encounter (Office Visit) with Henna Mancuso MD   Medication Sig Dispense Refill    benzonatate (TESSALON) 100 MG capsule Take 100 mg by mouth 3 times daily as needed for Cough      diphenhydrAMINE (BENADRYL) 25 MG capsule Take 25 mg by mouth every 6 hours as needed for Itching      pantoprazole (PROTONIX) 40 MG tablet Take 1 tablet by mouth daily (with breakfast) 30 tablet 3    escitalopram (LEXAPRO) 10 MG tablet Take 1 tablet by mouth daily 30 tablet 3    [] cetirizine (ZYRTEC) 10 MG tablet Take 1 tablet by mouth daily 30 tablet 0    ibuprofen (ADVIL;MOTRIN) 800 MG tablet Take 1 tablet by mouth every 6 hours as needed for Pain 20 tablet 3    albuterol sulfate HFA (PROVENTIL HFA) 108 (90 Base) MCG/ACT inhaler Inhale 2 puffs into the lungs every 4 hours as needed for Wheezing 1 Inhaler 1    valACYclovir (VALTREX) 500 MG tablet Take 500 mg by mouth daily       acyclovir (ZOVIRAX) 400 MG tablet Take 400 mg by mouth 2 times daily           Medications patient taking as of now reconciled against medications ordered at time of hospital discharge: Yes    Chief Complaint   Patient presents with    Follow-Up from Hospital    Heartburn       HPI    Inpatient course: Discharge summary reviewed- see chart.     Patient Elmer Murillo is a 35 y.o. presented with cough and congestion as well as diffuse neck lymphadenopathy, we admit the patient for evaluation to different for the following     1.  Diffuse Readings from Last 3 Encounters:   05/02/19 122/78   04/22/19 132/74   04/09/19 130/67       Physical Exam   Constitutional: She appears well-developed and well-nourished. No distress. Neck: Neck supple. Cardiovascular: Normal rate and regular rhythm. No murmur heard. Pulmonary/Chest: Effort normal and breath sounds normal. No respiratory distress. She has no wheezes. She has no rales. Musculoskeletal: She exhibits no edema, tenderness or deformity. Lymphadenopathy:     She has no cervical adenopathy. Skin: Skin is warm and dry. No rash noted. No erythema. Vitals reviewed. Assessment/Plan:  1.  Diffuse lymphadenopathy  Follow up with ID  - MD DISCHARGE MEDS RECONCILED W/ CURRENT OUTPATIENT MED LIST        Medical Decision Making: moderate complexity      Roque Walters MD

## 2019-05-13 LAB
FUNGUS (MYCOLOGY) CULTURE: NORMAL
FUNGUS STAIN: NORMAL

## 2019-05-28 LAB
AFB CULTURE (MYCOBACTERIA): NORMAL
AFB SMEAR: NORMAL

## 2019-07-03 ENCOUNTER — OFFICE VISIT (OUTPATIENT)
Dept: FAMILY MEDICINE CLINIC | Age: 34
End: 2019-07-03
Payer: MEDICAID

## 2019-07-03 VITALS
SYSTOLIC BLOOD PRESSURE: 128 MMHG | DIASTOLIC BLOOD PRESSURE: 74 MMHG | RESPIRATION RATE: 16 BRPM | HEART RATE: 90 BPM | OXYGEN SATURATION: 98 % | BODY MASS INDEX: 44.63 KG/M2 | WEIGHT: 260 LBS

## 2019-07-03 DIAGNOSIS — R59.1 LAD (LYMPHADENOPATHY): Primary | ICD-10-CM

## 2019-07-03 DIAGNOSIS — F41.1 GAD (GENERALIZED ANXIETY DISORDER): ICD-10-CM

## 2019-07-03 DIAGNOSIS — M25.50 ARTHRALGIA, UNSPECIFIED JOINT: ICD-10-CM

## 2019-07-03 DIAGNOSIS — J30.2 SEASONAL ALLERGIES: ICD-10-CM

## 2019-07-03 PROCEDURE — 99214 OFFICE O/P EST MOD 30 MIN: CPT | Performed by: FAMILY MEDICINE

## 2019-07-03 PROCEDURE — 1036F TOBACCO NON-USER: CPT | Performed by: FAMILY MEDICINE

## 2019-07-03 PROCEDURE — G8417 CALC BMI ABV UP PARAM F/U: HCPCS | Performed by: FAMILY MEDICINE

## 2019-07-03 PROCEDURE — G8427 DOCREV CUR MEDS BY ELIG CLIN: HCPCS | Performed by: FAMILY MEDICINE

## 2019-07-03 RX ORDER — TRIAMCINOLONE ACETONIDE 1 MG/G
CREAM TOPICAL
Qty: 80 G | Refills: 2 | Status: SHIPPED
Start: 2019-07-03 | End: 2021-02-09

## 2019-07-03 RX ORDER — MONTELUKAST SODIUM 10 MG/1
10 TABLET ORAL DAILY
Qty: 90 TABLET | Refills: 1 | Status: SHIPPED | OUTPATIENT
Start: 2019-07-03

## 2019-07-03 RX ORDER — CETIRIZINE HYDROCHLORIDE 10 MG/1
10 TABLET ORAL DAILY
Qty: 90 TABLET | Refills: 1 | Status: SHIPPED | OUTPATIENT
Start: 2019-07-03

## 2019-07-03 RX ORDER — ESCITALOPRAM OXALATE 20 MG/1
20 TABLET ORAL DAILY
Qty: 90 TABLET | Refills: 1 | Status: SHIPPED | OUTPATIENT
Start: 2019-07-03 | End: 2019-09-26 | Stop reason: SDUPTHER

## 2019-07-21 ASSESSMENT — ENCOUNTER SYMPTOMS
SHORTNESS OF BREATH: 0
DIARRHEA: 0
BACK PAIN: 0
CONSTIPATION: 0
EYE REDNESS: 0
SINUS PAIN: 0
COUGH: 1
SINUS PRESSURE: 0
VOMITING: 0
BLOOD IN STOOL: 0
CHEST TIGHTNESS: 0
NAUSEA: 0
SORE THROAT: 0
RHINORRHEA: 1
WHEEZING: 0
VOICE CHANGE: 0
ABDOMINAL PAIN: 0
COLOR CHANGE: 0

## 2019-08-21 ENCOUNTER — HOSPITAL ENCOUNTER (EMERGENCY)
Age: 34
Discharge: LEFT BEFORE DISCHARGE | End: 2019-08-21
Attending: EMERGENCY MEDICINE
Payer: COMMERCIAL

## 2019-08-21 VITALS
BODY MASS INDEX: 44.39 KG/M2 | HEIGHT: 64 IN | RESPIRATION RATE: 18 BRPM | SYSTOLIC BLOOD PRESSURE: 169 MMHG | DIASTOLIC BLOOD PRESSURE: 74 MMHG | HEART RATE: 79 BPM | TEMPERATURE: 98.2 F | OXYGEN SATURATION: 98 % | WEIGHT: 260 LBS

## 2019-09-06 ENCOUNTER — TELEPHONE (OUTPATIENT)
Dept: ADMINISTRATIVE | Age: 34
End: 2019-09-06

## 2019-09-06 ENCOUNTER — OFFICE VISIT (OUTPATIENT)
Dept: PRIMARY CARE CLINIC | Age: 34
End: 2019-09-06
Payer: MEDICAID

## 2019-09-06 VITALS
HEART RATE: 82 BPM | BODY MASS INDEX: 44.39 KG/M2 | SYSTOLIC BLOOD PRESSURE: 157 MMHG | HEIGHT: 64 IN | DIASTOLIC BLOOD PRESSURE: 99 MMHG | OXYGEN SATURATION: 100 % | RESPIRATION RATE: 18 BRPM | WEIGHT: 260 LBS

## 2019-09-06 DIAGNOSIS — G43.919 INTRACTABLE MIGRAINE WITHOUT STATUS MIGRAINOSUS, UNSPECIFIED MIGRAINE TYPE: Primary | ICD-10-CM

## 2019-09-06 DIAGNOSIS — R11.2 INTRACTABLE VOMITING WITH NAUSEA, UNSPECIFIED VOMITING TYPE: ICD-10-CM

## 2019-09-06 PROCEDURE — 1036F TOBACCO NON-USER: CPT | Performed by: NURSE PRACTITIONER

## 2019-09-06 PROCEDURE — G8427 DOCREV CUR MEDS BY ELIG CLIN: HCPCS | Performed by: NURSE PRACTITIONER

## 2019-09-06 PROCEDURE — 96160 PT-FOCUSED HLTH RISK ASSMT: CPT | Performed by: NURSE PRACTITIONER

## 2019-09-06 PROCEDURE — G8417 CALC BMI ABV UP PARAM F/U: HCPCS | Performed by: NURSE PRACTITIONER

## 2019-09-06 PROCEDURE — 99213 OFFICE O/P EST LOW 20 MIN: CPT | Performed by: NURSE PRACTITIONER

## 2019-09-06 RX ORDER — IBUPROFEN 800 MG/1
TABLET ORAL
Refills: 0 | COMMUNITY
Start: 2019-08-23

## 2019-09-06 RX ORDER — ESCITALOPRAM OXALATE 10 MG/1
TABLET ORAL
Refills: 0 | COMMUNITY
Start: 2019-08-31 | End: 2019-09-26 | Stop reason: SDUPTHER

## 2019-09-06 RX ORDER — ALBUTEROL SULFATE 0.63 MG/3ML
SOLUTION RESPIRATORY (INHALATION)
COMMUNITY
Start: 2012-11-07 | End: 2021-01-06

## 2019-09-06 RX ORDER — SUMATRIPTAN 25 MG/1
25 TABLET, FILM COATED ORAL
Qty: 30 TABLET | Refills: 0 | Status: SHIPPED | OUTPATIENT
Start: 2019-09-06 | End: 2019-09-06

## 2019-09-06 ASSESSMENT — ENCOUNTER SYMPTOMS
TROUBLE SWALLOWING: 0
WHEEZING: 0
DIARRHEA: 0
COUGH: 0
VOMITING: 1
CHEST TIGHTNESS: 0
SORE THROAT: 0
CONSTIPATION: 0
NAUSEA: 1
SHORTNESS OF BREATH: 0

## 2019-09-06 ASSESSMENT — PATIENT HEALTH QUESTIONNAIRE - PHQ9
4. FEELING TIRED OR HAVING LITTLE ENERGY: 3
8. MOVING OR SPEAKING SO SLOWLY THAT OTHER PEOPLE COULD HAVE NOTICED. OR THE OPPOSITE, BEING SO FIGETY OR RESTLESS THAT YOU HAVE BEEN MOVING AROUND A LOT MORE THAN USUAL: 0
1. LITTLE INTEREST OR PLEASURE IN DOING THINGS: 3
3. TROUBLE FALLING OR STAYING ASLEEP: 2
5. POOR APPETITE OR OVEREATING: 2
6. FEELING BAD ABOUT YOURSELF - OR THAT YOU ARE A FAILURE OR HAVE LET YOURSELF OR YOUR FAMILY DOWN: 3
2. FEELING DOWN, DEPRESSED OR HOPELESS: 3
10. IF YOU CHECKED OFF ANY PROBLEMS, HOW DIFFICULT HAVE THESE PROBLEMS MADE IT FOR YOU TO DO YOUR WORK, TAKE CARE OF THINGS AT HOME, OR GET ALONG WITH OTHER PEOPLE: 0
SUM OF ALL RESPONSES TO PHQ QUESTIONS 1-9: 16
SUM OF ALL RESPONSES TO PHQ9 QUESTIONS 1 & 2: 6
SUM OF ALL RESPONSES TO PHQ QUESTIONS 1-9: 16
9. THOUGHTS THAT YOU WOULD BE BETTER OFF DEAD, OR OF HURTING YOURSELF: 0
7. TROUBLE CONCENTRATING ON THINGS, SUCH AS READING THE NEWSPAPER OR WATCHING TELEVISION: 0

## 2019-09-06 NOTE — TELEPHONE ENCOUNTER
Pt called in to get her appt moved up, she just left the [facility] today for dizziness, nausea, states that while she was there she did have chest pains, the clinic couldn't do anything for her she said, I contacted the office and transferred call to Thalia Lai for further assistance

## 2019-09-06 NOTE — PATIENT INSTRUCTIONS
ibuprofen (Advil, Motrin), or naproxen (Aleve) for a sore throat or headache or to lower a fever. Read and follow all instructions on the label. · Do not take two or more pain medicines at the same time unless the doctor told you to. Many pain medicines have acetaminophen, which is Tylenol. Too much acetaminophen (Tylenol) can be harmful. · Do not play contact sports for 4 weeks. Do not lift anything heavy. Too much activity increases the chance that your spleen may break open. · Try not to spread the virus to others. Do not kiss or share dishes, glasses, eating utensils, or toothbrushes for at least two weeks. The virus is spread when saliva from an infected person gets in another person's mouth. It is hard to know how long you may be contagious. · If you know you have mono, do not donate blood. There is a chance of spreading the virus through blood products. When should you call for help? Call 911 anytime you think you may need emergency care. For example, call if:    · You passed out (lost consciousness).    Call your doctor now or seek immediate medical care if:    · You have new or worse belly pain.     · You have signs of needing more fluids. You have sunken eyes and a dry mouth, and you pass only a little urine.     · You are dizzy or lightheaded, or you feel like you may faint.     · You cannot swallow fluids.    Watch closely for changes in your health, and be sure to contact your doctor if:    · You do not get better as expected. Where can you learn more? Go to https://AgaripeSedicidodici.5o9. org and sign in to your [employer] account. Enter M191 in the [facility] box to learn more about \"Mononucleosis: Care Instructions. \"     If you do not have an account, please click on the \"Sign Up Now\" link. Current as of: July 30, 2018  Content Version: 12.1  © 9813-8354 Healthwise, Incorporated. Care instructions adapted under license by [facility] ([facility]).  If you have questions about a medical how to help you. When should you call for help? Call 911 anytime you think you may need emergency care. For example, call if:    · You passed out (lost consciousness).     · You have dizziness along with symptoms of a heart attack. These may include:  ? Chest pain or pressure, or a strange feeling in the chest.  ? Sweating. ? Shortness of breath. ? Nausea or vomiting. ? Pain, pressure, or a strange feeling in the back, neck, jaw, or upper belly or in one or both shoulders or arms. ? Lightheadedness or sudden weakness. ? A fast or irregular heartbeat.     · You have symptoms of a stroke. These may include:  ? Sudden numbness, tingling, weakness, or loss of movement in your face, arm, or leg, especially on only one side of your body. ? Sudden vision changes. ? Sudden trouble speaking. ? Sudden confusion or trouble understanding simple statements. ? Sudden problems with walking or balance. ? A sudden, severe headache that is different from past headaches.    Call your doctor now or seek immediate medical care if:    · You feel dizzy and have a fever, headache, or ringing in your ears.     · You have new or increased nausea and vomiting.     · Your dizziness does not go away or comes back.    Watch closely for changes in your health, and be sure to contact your doctor if:    · You do not get better as expected. Where can you learn more? Go to https://PonoMusicpebrooklyneweb.Internet Broadcasting. org and sign in to your [employer] account. Enter B516 in the [facility] box to learn more about \"Dizziness: Care Instructions. \"     If you do not have an account, please click on the \"Sign Up Now\" link. Current as of: September 23, 2018  Content Version: 12.1  © 9846-9493 Healthwise, [employer]. Care instructions adapted under license by [address]  If you have questions about a medical condition or this instruction, always ask your healthcare professional. [employer] disclaims any warranty or chronic problems    For dizziness - also try 1/2 tab benadryl

## 2019-09-06 NOTE — PROGRESS NOTES
Chief Complaint   Patient presents with    Nausea     worst ever experienced since pregnant with son he is 7 vomitted twice feels like something is in her throat     Dizziness     tired sitting or walking gets dizzy she said its like a buzzing feeling in her body hard to describe pt said     Migraine     been having them since she was a kid she has a migraine two wks straight        HPI:  Patient presents today for migraines for 2 weeks involving back of head, nonstop - 800mg ibuprofen without relief. Symptoms dizzy, tired, nausea, vomiting x3days. NOT relieved by food. Migraines precipitated by anger, job stress and anxiety. History of smoking. Currently vaping. She has been nauseated x 2 days as well as dizziness. Has zofran at home - has not taken any. This is what prompted her to come in today. She does have a PCP that she see's on a regular basis. Reports migraines 1-2 times a month. Prior to Visit Medications    Medication Sig Taking? Authorizing Provider   albuterol (ACCUNEB) 0.63 MG/3ML nebulizer solution Inhale into the lungs Yes Historical Provider, MD   ibuprofen (ADVIL;MOTRIN) 800 MG tablet  Yes Historical Provider, MD   escitalopram (LEXAPRO) 10 MG tablet  Yes Historical Provider, MD   diclofenac (VOLTAREN) 50 MG EC tablet  Yes Historical Provider, MD   cetirizine (ZYRTEC) 10 MG tablet Take 1 tablet by mouth daily Yes Michael Goldberg MD   montelukast (SINGULAIR) 10 MG tablet Take 1 tablet by mouth daily Yes Michael Goldberg MD   triamcinolone (KENALOG) 0.1 % cream Apply topically 2 times daily PRN eczema.  Yes Michael Goldberg MD   escitalopram (LEXAPRO) 20 MG tablet Take 1 tablet by mouth daily Yes Michael Goldberg MD   benzonatate (TESSALON) 100 MG capsule Take 100 mg by mouth 3 times daily as needed for Cough Yes Historical Provider, MD   diphenhydrAMINE (BENADRYL) 25 MG capsule Take 25 mg by mouth every 6 hours as needed for Itching Yes Historical Provider, MD   pantoprazole (PROTONIX) 40

## 2019-09-10 ENCOUNTER — HOSPITAL ENCOUNTER (OUTPATIENT)
Dept: GENERAL RADIOLOGY | Age: 34
End: 2019-09-10
Payer: MEDICAID

## 2019-09-10 ENCOUNTER — HOSPITAL ENCOUNTER (OUTPATIENT)
Dept: GENERAL RADIOLOGY | Age: 34
Discharge: HOME | End: 2019-09-12
Payer: MEDICAID

## 2019-09-10 DIAGNOSIS — N64.4 PAIN IN BREAST: ICD-10-CM

## 2019-09-10 PROCEDURE — G0279 TOMOSYNTHESIS, MAMMO: HCPCS

## 2019-09-12 ENCOUNTER — HOSPITAL ENCOUNTER (OUTPATIENT)
Age: 34
Discharge: HOME | End: 2019-09-14
Payer: MEDICAID

## 2019-09-12 ENCOUNTER — OFFICE VISIT (OUTPATIENT)
Dept: FAMILY MEDICINE CLINIC | Age: 34
End: 2019-09-12
Payer: MEDICAID

## 2019-09-12 VITALS
RESPIRATION RATE: 16 BRPM | WEIGHT: 271.8 LBS | BODY MASS INDEX: 46.4 KG/M2 | HEIGHT: 64 IN | SYSTOLIC BLOOD PRESSURE: 130 MMHG | HEART RATE: 86 BPM | DIASTOLIC BLOOD PRESSURE: 94 MMHG | OXYGEN SATURATION: 99 %

## 2019-09-12 DIAGNOSIS — R03.0 ELEVATED BLOOD PRESSURE READING: Primary | ICD-10-CM

## 2019-09-12 DIAGNOSIS — R11.0 NAUSEA: ICD-10-CM

## 2019-09-12 DIAGNOSIS — F17.200 NICOTINE DEPENDENCE, UNCOMPLICATED, UNSPECIFIED NICOTINE PRODUCT TYPE: ICD-10-CM

## 2019-09-12 DIAGNOSIS — R03.0 ELEVATED BLOOD PRESSURE READING: ICD-10-CM

## 2019-09-12 LAB
ALBUMIN SERPL-MCNC: 3.7 G/DL (ref 3.5–5.2)
ALP BLD-CCNC: 53 U/L (ref 35–104)
ALT SERPL-CCNC: 18 U/L (ref 0–32)
ANION GAP SERPL CALCULATED.3IONS-SCNC: 12 MMOL/L (ref 7–16)
AST SERPL-CCNC: 28 U/L (ref 0–31)
BASOPHILS ABSOLUTE: 0.06 E9/L (ref 0–0.2)
BASOPHILS RELATIVE PERCENT: 0.8 % (ref 0–2)
BILIRUB SERPL-MCNC: <0.2 MG/DL (ref 0–1.2)
BUN BLDV-MCNC: 9 MG/DL (ref 6–20)
CALCIUM SERPL-MCNC: 9.2 MG/DL (ref 8.6–10.2)
CHLORIDE BLD-SCNC: 101 MMOL/L (ref 98–107)
CO2: 24 MMOL/L (ref 22–29)
CREAT SERPL-MCNC: 0.7 MG/DL (ref 0.5–1)
EOSINOPHILS ABSOLUTE: 0.48 E9/L (ref 0.05–0.5)
EOSINOPHILS RELATIVE PERCENT: 6.7 % (ref 0–6)
GFR AFRICAN AMERICAN: >60
GFR NON-AFRICAN AMERICAN: >60 ML/MIN/1.73
GLUCOSE BLD-MCNC: 80 MG/DL (ref 74–99)
HBA1C MFR BLD: 6.1 % (ref 4–5.6)
HCG QUALITATIVE: NEGATIVE
HCT VFR BLD CALC: 41.2 % (ref 34–48)
HEMOGLOBIN: 12.5 G/DL (ref 11.5–15.5)
IMMATURE GRANULOCYTES #: 0.02 E9/L
IMMATURE GRANULOCYTES %: 0.3 % (ref 0–5)
LYMPHOCYTES ABSOLUTE: 2.96 E9/L (ref 1.5–4)
LYMPHOCYTES RELATIVE PERCENT: 41.3 % (ref 20–42)
MCH RBC QN AUTO: 25.7 PG (ref 26–35)
MCHC RBC AUTO-ENTMCNC: 30.3 % (ref 32–34.5)
MCV RBC AUTO: 84.6 FL (ref 80–99.9)
MONOCYTES ABSOLUTE: 0.66 E9/L (ref 0.1–0.95)
MONOCYTES RELATIVE PERCENT: 9.2 % (ref 2–12)
NEUTROPHILS ABSOLUTE: 2.98 E9/L (ref 1.8–7.3)
NEUTROPHILS RELATIVE PERCENT: 41.7 % (ref 43–80)
PDW BLD-RTO: 15.9 FL (ref 11.5–15)
PLATELET # BLD: 318 E9/L (ref 130–450)
PMV BLD AUTO: 11.5 FL (ref 7–12)
POTASSIUM SERPL-SCNC: 4.5 MMOL/L (ref 3.5–5)
RBC # BLD: 4.87 E12/L (ref 3.5–5.5)
SODIUM BLD-SCNC: 137 MMOL/L (ref 132–146)
TOTAL PROTEIN: 7.7 G/DL (ref 6.4–8.3)
TSH SERPL DL<=0.05 MIU/L-ACNC: 1.12 UIU/ML (ref 0.27–4.2)
WBC # BLD: 7.2 E9/L (ref 4.5–11.5)

## 2019-09-12 PROCEDURE — G8427 DOCREV CUR MEDS BY ELIG CLIN: HCPCS | Performed by: FAMILY MEDICINE

## 2019-09-12 PROCEDURE — 99214 OFFICE O/P EST MOD 30 MIN: CPT | Performed by: FAMILY MEDICINE

## 2019-09-12 PROCEDURE — 84443 ASSAY THYROID STIM HORMONE: CPT

## 2019-09-12 PROCEDURE — 1036F TOBACCO NON-USER: CPT | Performed by: FAMILY MEDICINE

## 2019-09-12 PROCEDURE — 84703 CHORIONIC GONADOTROPIN ASSAY: CPT

## 2019-09-12 PROCEDURE — 80053 COMPREHEN METABOLIC PANEL: CPT

## 2019-09-12 PROCEDURE — 85025 COMPLETE CBC W/AUTO DIFF WBC: CPT

## 2019-09-12 PROCEDURE — G8417 CALC BMI ABV UP PARAM F/U: HCPCS | Performed by: FAMILY MEDICINE

## 2019-09-12 PROCEDURE — 83036 HEMOGLOBIN GLYCOSYLATED A1C: CPT

## 2019-09-12 RX ORDER — AMLODIPINE BESYLATE 5 MG/1
5 TABLET ORAL DAILY
Qty: 30 TABLET | Refills: 0 | Status: SHIPPED | OUTPATIENT
Start: 2019-09-12

## 2019-09-12 RX ORDER — NICOTINE 21 MG/24HR
1 PATCH, TRANSDERMAL 24 HOURS TRANSDERMAL DAILY
Qty: 30 PATCH | Refills: 1 | Status: SHIPPED
Start: 2019-09-12 | End: 2021-01-06

## 2019-09-24 ASSESSMENT — ENCOUNTER SYMPTOMS
ABDOMINAL DISTENTION: 0
CHEST TIGHTNESS: 0
VOMITING: 0
COLOR CHANGE: 0
ABDOMINAL PAIN: 0
COUGH: 0
EYE REDNESS: 0
BACK PAIN: 0
DIARRHEA: 0
BLOOD IN STOOL: 0
WHEEZING: 0
SHORTNESS OF BREATH: 0
CONSTIPATION: 0

## 2020-05-19 NOTE — PROGRESS NOTES
endurance  Assessment: pain noted across R ankle with all prolonged activities, 6/10   Prognosis: Fair  Decision Making: Low Complexity  Activity Tolerance  Activity Tolerance: Patient Tolerated treatment well       Plan   Plan  Times per week: pt to be seen 2x/week/4-6 weeks   Current Treatment Recommendations: ROM, Endurance Training, Gait Training, Modalities, Home Exercise Program, Strengthening    G-Code  PT G-Codes  Functional Assessment Tool Used: professional judgement   Functional Limitation: Mobility: Walking and moving around  Mobility: Walking and Moving Around Current Status (): At least 20 percent but less than 40 percent impaired, limited or restricted  Mobility: Walking and Moving Around Goal Status ():  At least 1 percent but less than 20 percent impaired, limited or restricted             Goals  Time Frame for goals: 4-6 weeks  goal 1: decrease pain across R ankle with all prolonged activities, 0-4/10   goal 2: increase strength across R ankle to grossly 4+, 5/5 for all ranges improving static/dynamic balance to GOOD+  goal 3: improve heel-toe progression and stance R LE during gait   goal 4: improve endurance for all prolonged activities to GOOD/GOOD+  goal 5: assure I with [facility] for home management of condition          Sharla El, PT [Medical Office: ([facility])___] : at the medical office located in  [Home] : at home, [unfilled] , at the time of the visit. [Patient] : the patient [FreeTextEntry1] : The patient is a 61-year-old man whose brother had precancerous colonic polyp which could not be removed by colonoscopy.  Unfortunately, the brother recently  of COVID-19 before the polyp could be resected.  The patient feels well denying abdominal pain, diarrhea, constipation.  He has 2-3 solid bowel movements a day without melena or bright red blood per rectum.  He denies heartburn or dysphasia.  The patient's weight is stable.  The patient has not been admitted to the hospital in the past year and denies any cardiac issues.

## 2020-05-26 ENCOUNTER — TELEPHONE (OUTPATIENT)
Dept: FAMILY MEDICINE CLINIC | Age: 35
End: 2020-05-26

## 2020-05-26 NOTE — TELEPHONE ENCOUNTER
Pt called this office as well, asking for the letter for her piercing to resolve her migraines.  Please advise

## 2020-05-26 NOTE — TELEPHONE ENCOUNTER
Pt has piercing's for migraines but her right one fell out and she would like to get it put back in but in order to do that she needs a note from Doctor that she needs to have it done to avoid medications,.  She states her migraines are back daily    Please advise       Contact # [phone]

## 2021-01-06 ENCOUNTER — OFFICE VISIT (OUTPATIENT)
Dept: PRIMARY CARE CLINIC | Age: 36
End: 2021-01-06
Payer: COMMERCIAL

## 2021-01-06 VITALS
TEMPERATURE: 97.7 F | DIASTOLIC BLOOD PRESSURE: 87 MMHG | HEIGHT: 64 IN | OXYGEN SATURATION: 99 % | BODY MASS INDEX: 45.72 KG/M2 | SYSTOLIC BLOOD PRESSURE: 136 MMHG | HEART RATE: 89 BPM | WEIGHT: 267.8 LBS

## 2021-01-06 DIAGNOSIS — N30.00 ACUTE CYSTITIS WITHOUT HEMATURIA: ICD-10-CM

## 2021-01-06 DIAGNOSIS — R30.0 DYSURIA: ICD-10-CM

## 2021-01-06 DIAGNOSIS — R73.03 PREDIABETES: Primary | ICD-10-CM

## 2021-01-06 DIAGNOSIS — I10 ESSENTIAL HYPERTENSION: ICD-10-CM

## 2021-01-06 DIAGNOSIS — F41.1 GAD (GENERALIZED ANXIETY DISORDER): ICD-10-CM

## 2021-01-06 DIAGNOSIS — K21.9 GASTROESOPHAGEAL REFLUX DISEASE WITHOUT ESOPHAGITIS: ICD-10-CM

## 2021-01-06 DIAGNOSIS — G43.109 MIGRAINE WITH AURA AND WITHOUT STATUS MIGRAINOSUS, NOT INTRACTABLE: ICD-10-CM

## 2021-01-06 DIAGNOSIS — R68.2 DRY MOUTH: ICD-10-CM

## 2021-01-06 DIAGNOSIS — R73.03 PREDIABETES: ICD-10-CM

## 2021-01-06 DIAGNOSIS — R53.83 FATIGUE, UNSPECIFIED TYPE: ICD-10-CM

## 2021-01-06 LAB
BILIRUBIN, POC: ABNORMAL
BLOOD URINE, POC: ABNORMAL
CLARITY, POC: ABNORMAL
COLOR, POC: YELLOW
CREATININE URINE: 286 MG/DL (ref 29–226)
GLUCOSE URINE, POC: ABNORMAL
HBA1C MFR BLD: 6.2 %
KETONES, POC: ABNORMAL
LEUKOCYTE EST, POC: ABNORMAL
MICROALBUMIN UR-MCNC: <12 MG/L
MICROALBUMIN/CREAT UR-RTO: ABNORMAL (ref 0–30)
NITRITE, POC: ABNORMAL
PH, POC: 6
PROTEIN, POC: ABNORMAL
SPECIFIC GRAVITY, POC: >=1.03
UROBILINOGEN, POC: 0.2

## 2021-01-06 PROCEDURE — 83036 HEMOGLOBIN GLYCOSYLATED A1C: CPT | Performed by: FAMILY MEDICINE

## 2021-01-06 PROCEDURE — 99214 OFFICE O/P EST MOD 30 MIN: CPT | Performed by: FAMILY MEDICINE

## 2021-01-06 PROCEDURE — 81002 URINALYSIS NONAUTO W/O SCOPE: CPT | Performed by: FAMILY MEDICINE

## 2021-01-06 RX ORDER — SULFAMETHOXAZOLE AND TRIMETHOPRIM 800; 160 MG/1; MG/1
1 TABLET ORAL 2 TIMES DAILY
Qty: 6 TABLET | Refills: 0 | Status: SHIPPED | OUTPATIENT
Start: 2021-01-06 | End: 2021-01-09

## 2021-01-06 RX ORDER — HYDROCHLOROTHIAZIDE 25 MG/1
25 TABLET ORAL DAILY
Qty: 90 TABLET | Refills: 0 | Status: SHIPPED
Start: 2021-01-06 | End: 2021-03-31

## 2021-01-06 ASSESSMENT — ENCOUNTER SYMPTOMS
BLOOD IN STOOL: 0
CONSTIPATION: 0
VOMITING: 0
COUGH: 0
WHEEZING: 0
CHEST TIGHTNESS: 0
COLOR CHANGE: 0
DIARRHEA: 0
ABDOMINAL DISTENTION: 0
BACK PAIN: 0
EYE REDNESS: 0
ABDOMINAL PAIN: 0

## 2021-01-06 NOTE — PROGRESS NOTES
Tex Dyer  : 1985    Chief Complaint:     Chief Complaint   Patient presents with    Diabetes    Hypertension     HPI  Migraines improved. Only 2 since last visit. Happy w new meds. No s/e. Nausea. Unsure last menstrual.   Due for labs. Social History     Tobacco Use    Smoking status: Former Smoker     Packs/day: 0.10     Years: 2.00     Pack years: 0.20     Types: Cigars, Cigarettes     Start date:     Smokeless tobacco: Never Used    Tobacco comment: 1 pack of cigars a day - cigarettes only when she is stressed   Substance Use Topics    Alcohol use: Yes     Comment: rarely    Drug use: Not Currently     Types: Marijuana     Comment: once in awhile   wants to quit. BP controlled. Denies symptoms high bp including HA, vision changes, CP, SOB, edema, focal neuro deficits. Notes dry mouth frequently. Wants labs. New problem. UTI as well. Feels like one to her. Frequency and hesitancy and dysuria.      Past medical, surgical, family and social histories reviewed and updated today as appropriate    Health Maintenance Due   Topic Date Due    Varicella vaccine (1 of 2 - 2-dose childhood series) 10/05/1986    DTaP/Tdap/Td vaccine (1 - Tdap) 10/05/2004    Flu vaccine (1) 2020       Current Outpatient Medications   Medication Sig Dispense Refill    pantoprazole (PROTONIX) 40 MG tablet Take 1 tablet by mouth daily (with breakfast) PRN for GERD flares 90 tablet 0    hydroCHLOROthiazide (HYDRODIURIL) 25 MG tablet Take 1 tablet by mouth daily 90 tablet 0    escitalopram (LEXAPRO) 20 MG tablet Take 1 tablet by mouth daily 90 tablet 1    amLODIPine (NORVASC) 5 MG tablet Take 1 tablet by mouth daily 30 tablet 0    ibuprofen (ADVIL;MOTRIN) 800 MG tablet   0    cetirizine (ZYRTEC) 10 MG tablet Take 1 tablet by mouth daily 90 tablet 1    montelukast (SINGULAIR) 10 MG tablet Take 1 tablet by mouth daily 90 tablet 1  diphenhydrAMINE (BENADRYL) 25 MG capsule Take 25 mg by mouth every 6 hours as needed for Itching      acyclovir (ZOVIRAX) 400 MG tablet Take 400 mg by mouth 2 times daily       metFORMIN (GLUCOPHAGE-XR) 500 MG extended release tablet Take 1 tablet by mouth daily (with breakfast) 90 tablet 1    clindamycin (CLEOCIN-T) 1 % lotion Apply topically 2 times daily. 60 g 2    SUMAtriptan (IMITREX) 25 MG tablet Take 1 tablet by mouth once as needed for Migraine 30 tablet 0     No current facility-administered medications for this visit. Allergies   Allergen Reactions    Latex      Pt develops rash and instructed per dr to use non latex items    Codeine Other (See Comments)     Gets \"zonked out\"    Penicillins Other (See Comments)     Gets \"zonked out\"    Toradol [Ketorolac Tromethamine] Itching and Swelling     Lip swelling    Azithromycin Itching and Rash       REVIEW OF SYSTEMS  Review of Systems   Constitutional: Negative for activity change, appetite change, chills, diaphoresis, fever and unexpected weight change. Eyes: Negative for redness and visual disturbance. Respiratory: Negative for cough, chest tightness and wheezing. Cardiovascular: Negative for leg swelling. Gastrointestinal: Negative for abdominal distention, abdominal pain, blood in stool, constipation, diarrhea and vomiting. Genitourinary: Positive for difficulty urinating, dysuria and frequency. Negative for enuresis, flank pain, hematuria, pelvic pain and urgency. Musculoskeletal: Negative for arthralgias, back pain and myalgias. Skin: Negative for color change and rash. Neurological: Positive for headaches. Negative for syncope, light-headedness and numbness. Psychiatric/Behavioral: Negative for dysphoric mood. All other systems reviewed and are negative.       PHYSICAL EXAM  /87   Pulse 89   Temp 97.7 °F (36.5 °C)   Ht 5' 4\" (1.626 m)   Wt 267 lb 12.8 oz (121.5 kg)   SpO2 99%   BMI 45.97 kg/m² Physical Exam  Vitals signs reviewed. Constitutional:       General: She is not in acute distress. Appearance: She is well-developed. HENT:      Mouth/Throat:      Mouth: Mucous membranes are moist.      Pharynx: Oropharynx is clear. No oropharyngeal exudate or posterior oropharyngeal erythema. Cardiovascular:      Rate and Rhythm: Normal rate and regular rhythm. Heart sounds: Normal heart sounds. No murmur. Pulmonary:      Effort: Pulmonary effort is normal. No respiratory distress. Breath sounds: Normal breath sounds. No wheezing or rales. Abdominal:      General: Bowel sounds are normal. There is no distension. Palpations: Abdomen is soft. There is no mass. Tenderness: There is no abdominal tenderness. There is no right CVA tenderness, left CVA tenderness, guarding or rebound. ASSESSMENT/PLAN:     Diagnosis Orders   1. Prediabetes  POCT glycosylated hemoglobin (Hb A1C)    Lipid Panel    Microalbumin / Creatinine Urine Ratio   2. Essential hypertension  CBC Auto Differential    Comprehensive Metabolic Panel    Magnesium    Microalbumin / Creatinine Urine Ratio    hydroCHLOROthiazide (HYDRODIURIL) 25 MG tablet   3. Fatigue, unspecified type  TSH without Reflex    Vitamin D 25 Hydroxy    AMENA   4. Dysuria  POCT Urinalysis no Micro    Culture, Urine   5. Dry mouth  SJOGREN'S ANTIBODIES (SS-A, SS-B)   6. Migraine with aura and without status migrainosus, not intractable     7. CELESTE (generalized anxiety disorder)     8. Acute cystitis without hematuria  sulfamethoxazole-trimethoprim (BACTRIM DS) 800-160 MG per tablet   9.  Gastroesophageal reflux disease without esophagitis  pantoprazole (PROTONIX) 40 MG tablet Reviewed age and gender appropriate health screening exams and vaccinations. Advisedpatient regarding importance of keeping up with recommended health maintenance and to schedule as soon as possible if overdue, as this is important in assessing for undiagnosed pathology, especially cancer. Patientverbalizes understanding and agrees. Call or go to ED immediately if symptoms worsen or persist.  No follow-ups on file. Sooner if necessary. Counseled regarding above diagnosis, including possible risks and complications,especially if left uncontrolled. Counseled regarding the possible side effects, risks, benefits and alternatives to treatment; patient and/or guardian verbalizes understanding. Advised patient to call with any newmedication issues. All questions answered.     Rolando Farley MD

## 2021-01-09 LAB — URINE CULTURE, ROUTINE: NORMAL

## 2021-01-15 ENCOUNTER — HOSPITAL ENCOUNTER (OUTPATIENT)
Age: 36
Discharge: HOME | End: 2021-01-15
Payer: COMMERCIAL

## 2021-01-15 ENCOUNTER — TELEPHONE (OUTPATIENT)
Dept: SURGERY | Age: 36
End: 2021-01-15

## 2021-01-15 DIAGNOSIS — R73.03 PREDIABETES: ICD-10-CM

## 2021-01-15 DIAGNOSIS — I10 ESSENTIAL HYPERTENSION: ICD-10-CM

## 2021-01-15 DIAGNOSIS — R53.83 FATIGUE, UNSPECIFIED TYPE: ICD-10-CM

## 2021-01-15 DIAGNOSIS — R68.2 DRY MOUTH: ICD-10-CM

## 2021-01-15 LAB
ALBUMIN SERPL-MCNC: 3.7 G/DL (ref 3.5–5.2)
ALP BLD-CCNC: 66 U/L (ref 35–104)
ALT SERPL-CCNC: 22 U/L (ref 0–32)
ANION GAP SERPL CALCULATED.3IONS-SCNC: 9 MMOL/L (ref 7–16)
AST SERPL-CCNC: 18 U/L (ref 0–31)
BASOPHILS ABSOLUTE: 0.09 E9/L (ref 0–0.2)
BASOPHILS RELATIVE PERCENT: 1.6 % (ref 0–2)
BILIRUB SERPL-MCNC: 0.2 MG/DL (ref 0–1.2)
BUN BLDV-MCNC: 10 MG/DL (ref 6–20)
CALCIUM SERPL-MCNC: 8.6 MG/DL (ref 8.6–10.2)
CHLORIDE BLD-SCNC: 99 MMOL/L (ref 98–107)
CHOLESTEROL, TOTAL: 113 MG/DL (ref 0–199)
CO2: 30 MMOL/L (ref 22–29)
CREAT SERPL-MCNC: 0.9 MG/DL (ref 0.5–1)
EOSINOPHILS ABSOLUTE: 0.35 E9/L (ref 0.05–0.5)
EOSINOPHILS RELATIVE PERCENT: 6.2 % (ref 0–6)
GFR AFRICAN AMERICAN: >60
GFR NON-AFRICAN AMERICAN: >60 ML/MIN/1.73
GLUCOSE BLD-MCNC: 100 MG/DL (ref 74–99)
HCT VFR BLD CALC: 40.4 % (ref 34–48)
HDLC SERPL-MCNC: 41 MG/DL
HEMOGLOBIN: 12.7 G/DL (ref 11.5–15.5)
IMMATURE GRANULOCYTES #: 0.01 E9/L
IMMATURE GRANULOCYTES %: 0.2 % (ref 0–5)
LDL CHOLESTEROL CALCULATED: 63 MG/DL (ref 0–99)
LYMPHOCYTES ABSOLUTE: 2.49 E9/L (ref 1.5–4)
LYMPHOCYTES RELATIVE PERCENT: 44.3 % (ref 20–42)
MAGNESIUM: 2.3 MG/DL (ref 1.6–2.6)
MCH RBC QN AUTO: 24.9 PG (ref 26–35)
MCHC RBC AUTO-ENTMCNC: 31.4 % (ref 32–34.5)
MCV RBC AUTO: 79.2 FL (ref 80–99.9)
MONOCYTES ABSOLUTE: 0.51 E9/L (ref 0.1–0.95)
MONOCYTES RELATIVE PERCENT: 9.1 % (ref 2–12)
NEUTROPHILS ABSOLUTE: 2.17 E9/L (ref 1.8–7.3)
NEUTROPHILS RELATIVE PERCENT: 38.6 % (ref 43–80)
PDW BLD-RTO: 15.8 FL (ref 11.5–15)
PLATELET # BLD: 363 E9/L (ref 130–450)
PMV BLD AUTO: 11.2 FL (ref 7–12)
POTASSIUM SERPL-SCNC: 3.6 MMOL/L (ref 3.5–5)
RBC # BLD: 5.1 E12/L (ref 3.5–5.5)
SODIUM BLD-SCNC: 138 MMOL/L (ref 132–146)
TOTAL PROTEIN: 7.2 G/DL (ref 6.4–8.3)
TRIGL SERPL-MCNC: 43 MG/DL (ref 0–149)
TSH SERPL DL<=0.05 MIU/L-ACNC: 0.97 UIU/ML (ref 0.27–4.2)
VITAMIN D 25-HYDROXY: 13 NG/ML (ref 30–100)
VLDLC SERPL CALC-MCNC: 9 MG/DL
WBC # BLD: 5.6 E9/L (ref 4.5–11.5)

## 2021-01-15 PROCEDURE — 36415 COLL VENOUS BLD VENIPUNCTURE: CPT

## 2021-01-15 PROCEDURE — 84443 ASSAY THYROID STIM HORMONE: CPT

## 2021-01-15 PROCEDURE — 85025 COMPLETE CBC W/AUTO DIFF WBC: CPT

## 2021-01-15 PROCEDURE — 82306 VITAMIN D 25 HYDROXY: CPT

## 2021-01-15 PROCEDURE — 80061 LIPID PANEL: CPT

## 2021-01-15 PROCEDURE — 83735 ASSAY OF MAGNESIUM: CPT

## 2021-01-15 PROCEDURE — 86235 NUCLEAR ANTIGEN ANTIBODY: CPT

## 2021-01-15 PROCEDURE — 86038 ANTINUCLEAR ANTIBODIES: CPT

## 2021-01-15 PROCEDURE — 80053 COMPREHEN METABOLIC PANEL: CPT

## 2021-01-15 NOTE — TELEPHONE ENCOUNTER
Patient called in requesting appointment based [facility]. Patient requested female physician and scheduled with date and time that worked with her schedule. Patient scheduled with Dr Shabnam Talley 1/25/2021@ 12:45pm in [place]. Patient informed of location and what to bring to appointment. Appointment letter mailed to patient.      Electronically signed by Actiance on 1/15/21 at 9:23 AM EST

## 2021-01-18 LAB
ANTI-NUCLEAR ANTIBODY (ANA): NEGATIVE
ENA TO SSA (RO) ANTIBODY: NEGATIVE
ENA TO SSB (LA) ANTIBODY: NEGATIVE

## 2021-01-20 ENCOUNTER — NURSE ONLY (OUTPATIENT)
Dept: PRIMARY CARE CLINIC | Age: 36
End: 2021-01-20

## 2021-01-20 VITALS — SYSTOLIC BLOOD PRESSURE: 130 MMHG | DIASTOLIC BLOOD PRESSURE: 80 MMHG

## 2021-01-20 DIAGNOSIS — R73.03 PREDIABETES: Primary | ICD-10-CM

## 2021-01-25 ENCOUNTER — OFFICE VISIT (OUTPATIENT)
Dept: SURGERY | Age: 36
End: 2021-01-25
Payer: COMMERCIAL

## 2021-01-25 VITALS
DIASTOLIC BLOOD PRESSURE: 92 MMHG | SYSTOLIC BLOOD PRESSURE: 136 MMHG | RESPIRATION RATE: 16 BRPM | HEIGHT: 64 IN | WEIGHT: 264.4 LBS | TEMPERATURE: 96.2 F | OXYGEN SATURATION: 97 % | BODY MASS INDEX: 45.14 KG/M2 | HEART RATE: 87 BPM

## 2021-01-25 DIAGNOSIS — L73.2 HIDRADENITIS: ICD-10-CM

## 2021-01-25 PROCEDURE — 99202 OFFICE O/P NEW SF 15 MIN: CPT | Performed by: SURGERY

## 2021-01-25 PROCEDURE — 99203 OFFICE O/P NEW LOW 30 MIN: CPT | Performed by: SURGERY

## 2021-01-25 RX ORDER — CLINDAMYCIN PHOSPHATE 10 UG/ML
LOTION TOPICAL
Qty: 60 G | Refills: 2 | Status: SHIPPED | OUTPATIENT
Start: 2021-01-25 | End: 2021-02-24

## 2021-01-26 ENCOUNTER — TELEPHONE (OUTPATIENT)
Dept: PRIMARY CARE CLINIC | Age: 36
End: 2021-01-26

## 2021-01-26 RX ORDER — METFORMIN HYDROCHLORIDE 500 MG/1
500 TABLET, EXTENDED RELEASE ORAL
Qty: 90 TABLET | Refills: 1 | Status: SHIPPED
Start: 2021-01-26 | End: 2021-07-13

## 2021-01-26 ASSESSMENT — ENCOUNTER SYMPTOMS
GASTROINTESTINAL NEGATIVE: 1
RESPIRATORY NEGATIVE: 1
EYES NEGATIVE: 1
ALLERGIC/IMMUNOLOGIC NEGATIVE: 1

## 2021-01-26 NOTE — PROGRESS NOTES
[facility]  HISTORY & PHYSICAL  ATTENDING NOTE    Chief Complaint   Patient presents with    Hidradenitis     Bilateral axillary. Patient complains of ongoing issues since her teens. States she had them cut open once. States it occasionally drains and gets very large. HPI:  36y/o F presents to discuss hidraadenitis of the bilateral axillary. She states she has some hidradenitis of the inguinal/perineal area too, but it is not too bad and she did not want to discuss that area. She states the hidradenitis has been there for several years. She has had to have abscesses lanced or I&D. She had a really bad one of the right axilla a few months ago and she states she can't go through that again and just wants to be done with this. She has not tried any treatments yet. She has not been to see a dermatologist.    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  TIBIA FRACTURE SURGERY Right [id number]     No family history on file.     Social History     Tobacco Use    Smoking status: Former Smoker     Packs/day: 0.10     Years: 2.00     Pack years: 0.20     Types: Cigars, Cigarettes     Start date: 2016    Smokeless tobacco: Never Used    Tobacco comment: 1 pack of cigars a day - cigarettes only when she is stressed   Substance Use Topics    Alcohol use: Yes     Comment: rarely    Drug use: Not Currently     Types: Marijuana     Comment: once in awhile     Allergies   Allergen Reactions    Latex      Pt develops rash and instructed per dr to use non latex items    Codeine Other (See Comments)     Gets \"zonked out\"    Penicillins Other (See Comments)     Gets \"zonked out\"    Toradol [Ketorolac Tromethamine] Itching and Swelling     Lip swelling  Azithromycin Itching and Rash     Current Outpatient Medications   Medication Sig Dispense Refill    clindamycin (CLEOCIN-T) 1 % lotion Apply topically 2 times daily. 60 g 2    hydroCHLOROthiazide (HYDRODIURIL) 25 MG tablet Take 1 tablet by mouth daily 90 tablet 0    escitalopram (LEXAPRO) 20 MG tablet Take 1 tablet by mouth daily 90 tablet 1    amLODIPine (NORVASC) 5 MG tablet Take 1 tablet by mouth daily 30 tablet 0    ibuprofen (ADVIL;MOTRIN) 800 MG tablet   0    cetirizine (ZYRTEC) 10 MG tablet Take 1 tablet by mouth daily 90 tablet 1    montelukast (SINGULAIR) 10 MG tablet Take 1 tablet by mouth daily 90 tablet 1    diphenhydrAMINE (BENADRYL) 25 MG capsule Take 25 mg by mouth every 6 hours as needed for Itching      pantoprazole (PROTONIX) 40 MG tablet Take 1 tablet by mouth daily (with breakfast) (Patient taking differently: Take 40 mg by mouth daily (with breakfast) PRN) 30 tablet 3    acyclovir (ZOVIRAX) 400 MG tablet Take 400 mg by mouth 2 times daily       SUMAtriptan (IMITREX) 25 MG tablet Take 1 tablet by mouth once as needed for Migraine 30 tablet 0    triamcinolone (KENALOG) 0.1 % cream Apply topically 2 times daily PRN eczema. (Patient not taking: Reported on 1/25/2021) 80 g 2     No current facility-administered medications for this visit. Review of Systems   Constitutional: Negative. HENT: Negative. Eyes: Negative. Respiratory: Negative. Cardiovascular: Negative. Gastrointestinal: Negative. Endocrine: Negative. Genitourinary: Negative. Musculoskeletal: Negative. Skin: Positive for wound. Allergic/Immunologic: Negative. Neurological: Negative. Hematological: Negative. Psychiatric/Behavioral: Negative.       BP (!) 136/92 (Site: Left Upper Arm, Position: Sitting, Cuff Size: Large Adult)   Pulse 87   Temp 96.2 °F (35.7 °C) (Infrared)   Resp 16   Ht 5' 4\" (1.626 m)   Wt 264 lb 6.4 oz (119.9 kg)   SpO2 97%   BMI 45.38 kg/m²   Physical Exam Constitutional:       Appearance: Normal appearance. She is obese. HENT:      Head: Normocephalic and atraumatic. Eyes:      Extraocular Movements: Extraocular movements intact. Pupils: Pupils are equal, round, and reactive to light. Musculoskeletal:         General: No tenderness or signs of injury. Skin:     General: Skin is warm and dry. Findings: No abscess or erythema. Comments: Bilateral axilla with Tejada Jailyn Stage 2 hidradenitis. She has some scarring, pits, and sinuses. No currently draining wounds, no abscesses. Neurological:      General: No focal deficit present. Mental Status: She is alert and oriented to person, place, and time. Psychiatric:         Mood and Affect: Mood normal.         Behavior: Behavior normal.         Thought Content: Thought content normal.         Judgment: Judgment normal.     ASSESSMENT/PLAN:  Hidradenitis--Lima Stage 2  --I discussed what hidradenitis is. I discussed potential risk factors which include smoking and obesity.  --in order for her to \"just be done with this\" she would need full thickness removal of the skin from both axilla. She states she does not want to do that. --I discussed kenalog injections which I have had some success with. She did not want to go through with that. --I discuss oral and topical antibiotics. She agreed to start using clindamycin lotion. --I discussed at length the need to quit smoking. --I discussed metformin as a medication that has show some success with this problem. She wanted to go through with this.   I explained that will discuss with her PCP as he can then monitor for side effects, etc.    RTC CADE Mccollum MD, MSc, FACS  1/26/2021  1:57 PM

## 2021-01-26 NOTE — TELEPHONE ENCOUNTER
----- Message from Reg Rausch MD sent at 1/26/2021  1:59 PM EST -----  Regarding: hidradenitis patient  [place],  I saw Chidi Zuleta in the office yesterday. I went over options to treat her hidradenitis. She was amenable to trying clindamycin lotion, but also trying metformin. Metformin is not a medication I normally write for. I was wondering if it would be something you would be willing to try for her? She should show signs of improvement within 3-6 months if she uses it. If she wants to try kenalog injections or have this area surgically removed I can see her back in the office. Thank you.

## 2021-02-09 PROBLEM — G43.109 MIGRAINE WITH AURA AND WITHOUT STATUS MIGRAINOSUS, NOT INTRACTABLE: Status: ACTIVE | Noted: 2021-02-09

## 2021-02-09 PROBLEM — I10 ESSENTIAL HYPERTENSION: Status: ACTIVE | Noted: 2021-02-09

## 2021-02-09 PROBLEM — F41.1 GAD (GENERALIZED ANXIETY DISORDER): Status: ACTIVE | Noted: 2021-02-09

## 2021-02-09 PROBLEM — R73.03 PREDIABETES: Status: ACTIVE | Noted: 2021-02-09

## 2021-02-09 PROBLEM — K21.9 GASTROESOPHAGEAL REFLUX DISEASE WITHOUT ESOPHAGITIS: Status: ACTIVE | Noted: 2021-02-09

## 2021-02-09 RX ORDER — PANTOPRAZOLE SODIUM 40 MG/1
40 TABLET, DELAYED RELEASE ORAL
Qty: 90 TABLET | Refills: 0 | Status: SHIPPED
Start: 2021-02-09

## 2021-03-27 DIAGNOSIS — I10 ESSENTIAL HYPERTENSION: ICD-10-CM

## 2021-03-31 RX ORDER — HYDROCHLOROTHIAZIDE 25 MG/1
TABLET ORAL
Qty: 90 TABLET | Refills: 1 | Status: SHIPPED
Start: 2021-03-31 | End: 2021-09-16

## 2021-04-28 ENCOUNTER — PATIENT MESSAGE (OUTPATIENT)
Dept: PRIMARY CARE CLINIC | Age: 36
End: 2021-04-28

## 2021-04-28 RX ORDER — ALBUTEROL SULFATE 90 UG/1
2 AEROSOL, METERED RESPIRATORY (INHALATION) EVERY 6 HOURS PRN
Qty: 1 INHALER | Refills: 3 | Status: SHIPPED
Start: 2021-04-28 | End: 2021-05-21 | Stop reason: SDUPTHER

## 2021-04-28 NOTE — TELEPHONE ENCOUNTER
From: Linh Clarke  To: Gerry Oliver MD  Sent: 4/28/2021 1:14 PM EDT  Subject: Prescription Question    Could you refill my inhaler prescription please? Or do I need to come in the office to get a refill?

## 2021-05-21 RX ORDER — FLUTICASONE PROPIONATE 110 UG/1
2 AEROSOL, METERED RESPIRATORY (INHALATION) 2 TIMES DAILY
Qty: 1 INHALER | Refills: 3 | Status: SHIPPED | OUTPATIENT
Start: 2021-05-21 | End: 2022-05-21

## 2021-05-21 RX ORDER — ALBUTEROL SULFATE 90 UG/1
2 AEROSOL, METERED RESPIRATORY (INHALATION) EVERY 6 HOURS PRN
Qty: 1 INHALER | Refills: 3 | Status: SHIPPED
Start: 2021-05-21 | Stop reason: SDUPTHER

## 2021-07-13 RX ORDER — METFORMIN HYDROCHLORIDE 500 MG/1
TABLET, EXTENDED RELEASE ORAL
Qty: 90 TABLET | Refills: 1 | Status: SHIPPED | OUTPATIENT
Start: 2021-07-13

## 2021-09-10 DIAGNOSIS — I10 ESSENTIAL HYPERTENSION: ICD-10-CM

## 2021-09-16 RX ORDER — HYDROCHLOROTHIAZIDE 25 MG/1
TABLET ORAL
Qty: 90 TABLET | Refills: 1 | Status: SHIPPED | OUTPATIENT
Start: 2021-09-16

## 2021-10-22 RX ORDER — ALBUTEROL SULFATE 90 UG/1
AEROSOL, METERED RESPIRATORY (INHALATION)
Qty: 8.5 EACH | Refills: 2 | Status: SHIPPED | OUTPATIENT
Start: 2021-10-22

## 2023-07-17 ENCOUNTER — APPOINTMENT (OUTPATIENT)
Dept: GENERAL RADIOLOGY | Age: 38
End: 2023-07-17
Payer: COMMERCIAL

## 2023-07-17 ENCOUNTER — HOSPITAL ENCOUNTER (EMERGENCY)
Age: 38
Discharge: HOME | End: 2023-07-17
Payer: COMMERCIAL

## 2023-07-17 VITALS
TEMPERATURE: 98.2 F | DIASTOLIC BLOOD PRESSURE: 110 MMHG | HEIGHT: 64 IN | OXYGEN SATURATION: 94 % | HEART RATE: 86 BPM | WEIGHT: 280 LBS | RESPIRATION RATE: 20 BRPM | BODY MASS INDEX: 47.8 KG/M2 | SYSTOLIC BLOOD PRESSURE: 150 MMHG

## 2023-07-17 DIAGNOSIS — M19.071 OSTEOARTHRITIS OF RIGHT ANKLE, UNSPECIFIED OSTEOARTHRITIS TYPE: ICD-10-CM

## 2023-07-17 DIAGNOSIS — S93.401A SPRAIN OF RIGHT ANKLE, UNSPECIFIED LIGAMENT, INITIAL ENCOUNTER: Primary | ICD-10-CM

## 2023-07-17 PROCEDURE — 73630 X-RAY EXAM OF FOOT: CPT

## 2023-07-17 PROCEDURE — 73610 X-RAY EXAM OF ANKLE: CPT

## 2023-07-17 PROCEDURE — 99283 EMERGENCY DEPT VISIT LOW MDM: CPT

## 2023-07-17 RX ORDER — ACETAMINOPHEN 500 MG
500 TABLET ORAL 4 TIMES DAILY PRN
Qty: 20 TABLET | Refills: 0 | Status: SHIPPED | OUTPATIENT
Start: 2023-07-17

## 2023-07-17 ASSESSMENT — PAIN - FUNCTIONAL ASSESSMENT: PAIN_FUNCTIONAL_ASSESSMENT: 0-10

## 2023-07-17 ASSESSMENT — PAIN DESCRIPTION - ORIENTATION: ORIENTATION: RIGHT

## 2023-07-17 ASSESSMENT — PAIN DESCRIPTION - LOCATION: LOCATION: ANKLE

## 2023-07-17 ASSESSMENT — PAIN DESCRIPTION - DESCRIPTORS: DESCRIPTORS: BURNING

## 2023-07-17 ASSESSMENT — LIFESTYLE VARIABLES
HOW OFTEN DO YOU HAVE A DRINK CONTAINING ALCOHOL: NEVER
HOW MANY STANDARD DRINKS CONTAINING ALCOHOL DO YOU HAVE ON A TYPICAL DAY: PATIENT DOES NOT DRINK

## 2023-07-17 ASSESSMENT — PAIN DESCRIPTION - FREQUENCY: FREQUENCY: CONTINUOUS

## 2023-07-17 ASSESSMENT — PAIN SCALES - GENERAL: PAINLEVEL_OUTOF10: 7

## 2023-07-17 ASSESSMENT — PAIN DESCRIPTION - PAIN TYPE: TYPE: ACUTE PAIN

## 2023-07-18 NOTE — ED PROVIDER NOTES
[address] ENCOUNTER        Pt Name: Louis Morrison  MRN: [id number]  [address] [facility] [place] 1985  Date of evaluation: 7/17/2023  Provider: ARNOLD Adams CNP  PCP: No primary care provider on file. Note Started: 11:54 PM EDT 7/17/23      [place]. I have evaluated this patient. CHIEF COMPLAINT       Chief Complaint   Patient presents with    Ankle Pain     Right ankle and lower leg pain, no known injury, pain worsening over the past year- today unable to tolerate pain         HISTORY OF PRESENT ILLNESS: 1 or more Elements     History from : Patient    Limitations to history : None    Louis Morrison is a 40 y.o. female who presents to department for ankle pain. Patient states that she has had right ankle and foot pain for quite some time she states that possibly over 3 years however patient states that she has been on her feet a lot at work over the last year and over the last several weeks she believes that being on her feet has caused her to roll her right ankle. Patient states that she has not had any direct injury no trauma no falling down the stairs. Patient states that she is able to walk with extreme pain. Patient states that she does have a history of arthritis to the right foot and ankle. She denies any fevers chills nausea vomiting she denies any open wounds or lacerations. Patient states that she has not seen or followed up with her primary care physician or orthopedic surgeon in quite some time. States that she has a history of an ORIF of the right tibia from an injury that she sustained in 2014. Nursing Notes were all reviewed and agreed with or any disagreements were addressed in the HPI. REVIEW OF SYSTEMS :      Review of Systems   All other systems reviewed and are negative. Positives and Pertinent negatives as per HPI.      SURGICAL HISTORY     Past Surgical History:   Procedure Laterality Date

## 2023-08-30 RX ORDER — ALBUTEROL SULFATE 90 UG/1
AEROSOL, METERED RESPIRATORY (INHALATION)
Qty: 8.5 EACH | Refills: 0 | Status: SHIPPED | OUTPATIENT
Start: 2023-08-30

## 2023-10-04 RX ORDER — ALBUTEROL SULFATE 90 UG/1
AEROSOL, METERED RESPIRATORY (INHALATION)
Qty: 8.5 EACH | Refills: 0 | OUTPATIENT
Start: 2023-10-04

## (undated) DEVICE — STANDARD HYPODERMIC NEEDLE,ALUMINUM HUB: Brand: MONOJECT

## (undated) DEVICE — PATIENT RETURN ELECTRODE, SINGLE-USE, CONTACT QUALITY MONITORING, ADULT, WITH 9FT CORD, FOR PATIENTS WEIGING OVER 33LBS. (15KG): Brand: MEGADYNE

## (undated) DEVICE — SHEET, T, LAPAROTOMY, STERILE: Brand: MEDLINE

## (undated) DEVICE — GOWN,SIRUS,FABRNF,XL,20/CS: Brand: MEDLINE

## (undated) DEVICE — TOWEL,OR,DSP,ST,BLUE,STD,6/PK,12PK/CS: Brand: MEDLINE

## (undated) DEVICE — 4-PORT MANIFOLD: Brand: NEPTUNE 2

## (undated) DEVICE — BAG SPECIMEN BIOHAZARD 6IN X 9IN

## (undated) DEVICE — TRAP SPEC MUCUS FOR SUCT

## (undated) DEVICE — GEL US 20GM NONIRRITATING OVERWRAPPED FILE PCH TRNSMIT

## (undated) DEVICE — DRESSING FOAM W22XL25CM FILVE LAYR FOAM DP DEF SAFETAC

## (undated) DEVICE — PACK PROCEDURE SURG GEN CUST

## (undated) DEVICE — READY WET SKIN SCRUB TRAY-LF: Brand: MEDLINE INDUSTRIES, INC.

## (undated) DEVICE — INTENDED FOR TISSUE SEPARATION, AND OTHER PROCEDURES THAT REQUIRE A SHARP SURGICAL BLADE TO PUNCTURE OR CUT.: Brand: BARD-PARKER ® STAINLESS STEEL BLADES

## (undated) DEVICE — GOWN,SIRUS,FABRNF,L,20/CS: Brand: MEDLINE

## (undated) DEVICE — DOUBLE BASIN SET: Brand: MEDLINE INDUSTRIES, INC.

## (undated) DEVICE — BLADE CLIPPER GEN PURP NS

## (undated) DEVICE — DRAPE NEG PRSS W30.5XL26CM POLYUR ADH VAC